# Patient Record
Sex: FEMALE | Race: ASIAN | NOT HISPANIC OR LATINO
[De-identification: names, ages, dates, MRNs, and addresses within clinical notes are randomized per-mention and may not be internally consistent; named-entity substitution may affect disease eponyms.]

---

## 2020-03-10 PROBLEM — Z00.00 ENCOUNTER FOR PREVENTIVE HEALTH EXAMINATION: Status: ACTIVE | Noted: 2020-03-10

## 2020-03-12 ENCOUNTER — APPOINTMENT (OUTPATIENT)
Dept: ORTHOPEDIC SURGERY | Facility: CLINIC | Age: 70
End: 2020-03-12
Payer: MEDICARE

## 2020-03-12 VITALS
BODY MASS INDEX: 34.1 KG/M2 | DIASTOLIC BLOOD PRESSURE: 83 MMHG | HEART RATE: 97 BPM | SYSTOLIC BLOOD PRESSURE: 123 MMHG | WEIGHT: 225 LBS | HEIGHT: 68 IN

## 2020-03-12 DIAGNOSIS — Z78.9 OTHER SPECIFIED HEALTH STATUS: ICD-10-CM

## 2020-03-12 DIAGNOSIS — M17.0 BILATERAL PRIMARY OSTEOARTHRITIS OF KNEE: ICD-10-CM

## 2020-03-12 DIAGNOSIS — Z56.0 UNEMPLOYMENT, UNSPECIFIED: ICD-10-CM

## 2020-03-12 DIAGNOSIS — Z82.49 FAMILY HISTORY OF ISCHEMIC HEART DISEASE AND OTHER DISEASES OF THE CIRCULATORY SYSTEM: ICD-10-CM

## 2020-03-12 DIAGNOSIS — Z86.39 PERSONAL HISTORY OF OTHER ENDOCRINE, NUTRITIONAL AND METABOLIC DISEASE: ICD-10-CM

## 2020-03-12 DIAGNOSIS — Z86.79 PERSONAL HISTORY OF OTHER DISEASES OF THE CIRCULATORY SYSTEM: ICD-10-CM

## 2020-03-12 DIAGNOSIS — Z72.3 LACK OF PHYSICAL EXERCISE: ICD-10-CM

## 2020-03-12 DIAGNOSIS — Z80.41 FAMILY HISTORY OF MALIGNANT NEOPLASM OF OVARY: ICD-10-CM

## 2020-03-12 DIAGNOSIS — Z83.3 FAMILY HISTORY OF DIABETES MELLITUS: ICD-10-CM

## 2020-03-12 DIAGNOSIS — Z87.39 PERSONAL HISTORY OF OTHER DISEASES OF THE MUSCULOSKELETAL SYSTEM AND CONNECTIVE TISSUE: ICD-10-CM

## 2020-03-12 PROCEDURE — 72170 X-RAY EXAM OF PELVIS: CPT

## 2020-03-12 PROCEDURE — 99204 OFFICE O/P NEW MOD 45 MIN: CPT

## 2020-03-12 PROCEDURE — 73562 X-RAY EXAM OF KNEE 3: CPT | Mod: LT

## 2020-03-12 SDOH — ECONOMIC STABILITY - INCOME SECURITY: UNEMPLOYMENT, UNSPECIFIED: Z56.0

## 2020-03-16 PROBLEM — Z78.9 CURRENT NON-SMOKER: Status: ACTIVE | Noted: 2020-03-12

## 2020-03-16 PROBLEM — Z56.0 UNEMPLOYED: Status: ACTIVE | Noted: 2020-03-12

## 2020-03-16 PROBLEM — Z78.9 RARELY CONSUMES ALCOHOL: Status: ACTIVE | Noted: 2020-03-12

## 2020-03-16 PROBLEM — Z83.3 FAMILY HISTORY OF DIABETES MELLITUS: Status: ACTIVE | Noted: 2020-03-12

## 2020-03-16 PROBLEM — Z80.41 FAMILY HISTORY OF MALIGNANT NEOPLASM OF OVARY: Status: ACTIVE | Noted: 2020-03-12

## 2020-03-16 PROBLEM — Z86.39 HISTORY OF HYPERCHOLESTEROLEMIA: Status: RESOLVED | Noted: 2020-03-12 | Resolved: 2020-03-16

## 2020-03-16 PROBLEM — Z86.79 HISTORY OF HYPERTENSION: Status: RESOLVED | Noted: 2020-03-12 | Resolved: 2020-03-16

## 2020-03-16 PROBLEM — Z87.39 HISTORY OF ARTHRITIS: Status: RESOLVED | Noted: 2020-03-12 | Resolved: 2020-03-16

## 2020-03-16 PROBLEM — Z78.9 DOES NOT USE ILLICIT DRUGS: Status: ACTIVE | Noted: 2020-03-12

## 2020-03-16 PROBLEM — Z82.49 FAMILY HISTORY OF HYPERTENSION: Status: ACTIVE | Noted: 2020-03-12

## 2020-03-16 PROBLEM — Z86.39 HISTORY OF DIABETES MELLITUS: Status: RESOLVED | Noted: 2020-03-12 | Resolved: 2020-03-16

## 2020-03-16 PROBLEM — Z72.3 DOES NOT EXERCISE: Status: ACTIVE | Noted: 2020-03-12

## 2020-03-16 RX ORDER — METFORMIN HYDROCHLORIDE 625 MG/1
TABLET ORAL
Refills: 0 | Status: ACTIVE | COMMUNITY

## 2020-03-16 RX ORDER — CILOSTAZOL 100 MG/1
TABLET ORAL
Refills: 0 | Status: ACTIVE | COMMUNITY

## 2020-03-16 RX ORDER — LOSARTAN POTASSIUM 100 MG/1
TABLET, FILM COATED ORAL
Refills: 0 | Status: ACTIVE | COMMUNITY

## 2020-03-16 RX ORDER — SIMVASTATIN 80 MG/1
TABLET, FILM COATED ORAL
Refills: 0 | Status: ACTIVE | COMMUNITY

## 2020-03-16 NOTE — PHYSICAL EXAM
[Antalgic] : antalgic [LE] : Sensory: Intact in bilateral lower extremities [Knee] : patellar 2+ and symmetric bilaterally [Ankle] : ankle 2+ and symmetric bilaterally [DP] : dorsalis pedis 2+ and symmetric bilaterally [PT] : posterior tibial 2+ and symmetric bilaterally [de-identified] : On general examination the patient is adequately groomed and nourished. The vital parameters are as recorded. \par There is no lymphedema or diffuse swelling, no varicose veins, no skin warmth/erythema/scars/swelling, no ulcers and no palpable lymph nodes or masses in both lower extremities. Bilateral pedal pulses are well palpable.\par Upper Extremity:\par Both right and left upper extremities are unremarkable in terms of skin rash, lesions, pigmentation, redness, tenderness, swelling, joint instability, abnormal deformity or crepitus. The overall range of motion, sensation, motor tone and strength testing are normal.\par \par Bilateral Knee Exam: \par The gait is bilateral stiff knee antalgic.\par Knee alignment:            Right 5 degrees varus with no flexion deformity. \par Left 5 degrees varus with no flexion deformity.\par Both knees demonstrate no scars and the skin has no warmth, erythema, swelling or tenderness. \par Both knees have a range of motion of\par Extension:                    Right 0 degrees     Left 0 degrees\par Flexion:                                   Right 100 degrees      Left 100 degrees\par There is bilateral knee medial joint line tenderness. There is bilateral knee mild effusion. \par King's test is positive. Jeanmarie test is positive.\par Lachman's test, Anterior/Posterior Drawer test and Pivot Shift Tests are negative. \par There is bilateral knee grade 1 MCL mediolateral laxity and no anteroposterior instability. \par Patella compression test is positive and patellofemoral tracking is normal with no lateral subluxation, apprehension or instability. \par Right knee quadriceps and hamstrings power is 4+.\par Left knee quadriceps and hamstrings power is 4+.\par \par Hip Exam:\par The gait and station is normal\par The patient has equal leg lengths and no pelvic tilt. Bony/Federico test is 7 inches on the right and 7 inches on the left. Active SLR is 60 degrees on the right and 60 degrees on the left. Both hips demonstrate no scars and the skin has no signs of inflammation or tenderness. \par Both Hips have a normal range of motion of flexion to 100 degrees, abduction 40 degrees, adduction 20 degrees, external rotation 40 degrees, internal rotation 20 degrees with symmetrical motion in flexion and extension. There is no flexion contracture, deformity or instability. Labral impingement tests are negative.\par Both hips flexor, abductor and extensor power is normal.  [de-identified] : The following radiographs were ordered and read by me during this patients visit. I reviewed each radiograph in detail with the patient and discussed the findings as highlighted below. \par AP, lateral and skyline views of the bilateral knees confirm advanced degenerative joint disease with medial joint space narrowing and osteophyte formation\par AP view of the pelvis are within normal limits \par \par MRI of right knee taken in 2017 reveals medial meniscus tear, with extrusion of torn medial meniscus, lateral meniscus tear, diffuse full thickness articular loss, moderate joint effusion, Trace popliteal cyst.

## 2020-03-16 NOTE — HISTORY OF PRESENT ILLNESS
[8] : an average pain level of 8/10 [de-identified] : Ms. CORAZON MORALES is a 69 year old female presenting with bilateral knee pain, now worsening. Right worse than left.  She localizes the pain to the medial and anterior aspect of the bilateral knee. The patient describes the pain as sharp stabbing pain, and states it is intermittent, based on activity. She states the pain is exacerbated by walking long distance, climbing/descending stairs, and rising from a seated position. She has been taking Ibuprofen long term for pain with only mild temporary relief. She admits to prior conservative management inclusive of physical therapy with only mild improvement in condition. Has had a history of gel and cortisone injections, which only works temporarily, last injection was 3 weeks ago, a steroid injection to the bilateral knees. . She denies hip or lower back pain. The patient admits to limitations in their quality of life, and is present to discuss options for treatment. JTM \par DM on metformin with A1C of 7. Temporal Arteritis

## 2020-03-16 NOTE — DISCUSSION/SUMMARY
[de-identified] : Bilateral Knee advanced degenerative joint disease, Right worse than left. \par The natural history and treatment of degenerative arthritis was discussed with the patient at length today. The spectrum of treatment including nonoperative modalities to surgical intervention was elucidated. Noninvasive and nonoperative treatment modalities include weight reduction, activity modification with low impact exercise,  as needed use of acetaminophen or anti-inflammatory medications if tolerated, glucosamine/chondroitin supplements, and physical therapy. Further treatments can include corticosteroid injection and the use of viscosupplementation with hyaluronic acid injections. Definitive surgical treatment can certainly include total joint arthroplasty also. The risks and benefits of each treatment options was discussed and all questions were answered.\par In view of lack of adequate pain relief with conservative (non-surgical) management protocol including physical therapy, home exercises, weight loss, activity modification, NSAIDS; the patient is recommended to consider a Right Total Knee Replacement. \par \par The risks, benefits, alternatives, implications, complications including but not limited to pain, stiffness, bleeding, limp, wound breakdown, infection, bone fracture, nerve and vascular compromise, implant wear, fixation options depending on bone quality, instability, and durability issues and rehabilitation were discussed and relevant questions were addressed. The possibility of recurrent pain, no improvement in pain and actual worsening of the pain were also mentioned in conversation with the patient. Medical complications related to the patient's general medical health including deep vein thrombosis, pulmonary embolus, heart attack, stroke, death and other complications from anesthesia were discussed as well.  Anticoagulation prophylaxis medication options to address risks of deep vein thrombosis and pulmonary embolism were discussed and weighed against the risks of bleeding and wound healing complications. The patient elected Ecotrin/Xarelto prophylaxis with mechanical modalities.\par \par  I have reviewed the plan of care as well as a model of a total knee replacement implant equivalent to the one that will be used for their total knee replacement.  The patient agrees with the plan of care, as well as the use of implants for their total knee replacement.  The patient wishes to proceed and will undergo preoperative medical evaluation and clearance, attend the preoperative educational class and will schedule surgery appropriately. \par \par Due to recent injection to the bilateral knees, patient must wait until at least June 2020 for surgery, to decrease risk of infection and optimize patient fro elective surgery.

## 2021-03-18 ENCOUNTER — APPOINTMENT (OUTPATIENT)
Dept: ORTHOPEDIC SURGERY | Facility: CLINIC | Age: 71
End: 2021-03-18
Payer: MEDICARE

## 2021-03-18 VITALS — BODY MASS INDEX: 33.34 KG/M2 | HEIGHT: 68 IN | WEIGHT: 220 LBS

## 2021-03-18 DIAGNOSIS — M25.561 PAIN IN RIGHT KNEE: ICD-10-CM

## 2021-03-18 DIAGNOSIS — G89.29 PAIN IN RIGHT KNEE: ICD-10-CM

## 2021-03-18 DIAGNOSIS — M25.562 PAIN IN RIGHT KNEE: ICD-10-CM

## 2021-03-18 PROCEDURE — 73562 X-RAY EXAM OF KNEE 3: CPT | Mod: LT

## 2021-03-18 PROCEDURE — 99072 ADDL SUPL MATRL&STAF TM PHE: CPT

## 2021-03-18 PROCEDURE — 99213 OFFICE O/P EST LOW 20 MIN: CPT

## 2021-03-30 ENCOUNTER — OUTPATIENT (OUTPATIENT)
Dept: OUTPATIENT SERVICES | Facility: HOSPITAL | Age: 71
LOS: 1 days | End: 2021-03-30
Payer: MEDICARE

## 2021-03-30 VITALS
SYSTOLIC BLOOD PRESSURE: 110 MMHG | HEART RATE: 89 BPM | OXYGEN SATURATION: 98 % | TEMPERATURE: 97 F | RESPIRATION RATE: 16 BRPM | HEIGHT: 66 IN | DIASTOLIC BLOOD PRESSURE: 64 MMHG | WEIGHT: 218.04 LBS

## 2021-03-30 DIAGNOSIS — I10 ESSENTIAL (PRIMARY) HYPERTENSION: ICD-10-CM

## 2021-03-30 DIAGNOSIS — R94.31 ABNORMAL ELECTROCARDIOGRAM [ECG] [EKG]: ICD-10-CM

## 2021-03-30 DIAGNOSIS — E11.9 TYPE 2 DIABETES MELLITUS WITHOUT COMPLICATIONS: ICD-10-CM

## 2021-03-30 DIAGNOSIS — M17.11 UNILATERAL PRIMARY OSTEOARTHRITIS, RIGHT KNEE: ICD-10-CM

## 2021-03-30 DIAGNOSIS — Z98.890 OTHER SPECIFIED POSTPROCEDURAL STATES: Chronic | ICD-10-CM

## 2021-03-30 LAB
A1C WITH ESTIMATED AVERAGE GLUCOSE RESULT: 7.2 % — HIGH (ref 4–5.6)
ANION GAP SERPL CALC-SCNC: 12 MMOL/L — SIGNIFICANT CHANGE UP (ref 7–14)
APPEARANCE UR: CLEAR — SIGNIFICANT CHANGE UP
BILIRUB UR-MCNC: NEGATIVE — SIGNIFICANT CHANGE UP
BLD GP AB SCN SERPL QL: NEGATIVE — SIGNIFICANT CHANGE UP
BUN SERPL-MCNC: 17 MG/DL — SIGNIFICANT CHANGE UP (ref 7–23)
CALCIUM SERPL-MCNC: 9.9 MG/DL — SIGNIFICANT CHANGE UP (ref 8.4–10.5)
CHLORIDE SERPL-SCNC: 102 MMOL/L — SIGNIFICANT CHANGE UP (ref 98–107)
CO2 SERPL-SCNC: 22 MMOL/L — SIGNIFICANT CHANGE UP (ref 22–31)
COLOR SPEC: YELLOW — SIGNIFICANT CHANGE UP
CREAT SERPL-MCNC: 0.92 MG/DL — SIGNIFICANT CHANGE UP (ref 0.5–1.3)
DIFF PNL FLD: NEGATIVE — SIGNIFICANT CHANGE UP
ESTIMATED AVERAGE GLUCOSE: 160 MG/DL — HIGH (ref 68–114)
GLUCOSE SERPL-MCNC: 111 MG/DL — HIGH (ref 70–99)
GLUCOSE UR QL: NEGATIVE — SIGNIFICANT CHANGE UP
HCT VFR BLD CALC: 34.2 % — LOW (ref 34.5–45)
HGB BLD-MCNC: 10.9 G/DL — LOW (ref 11.5–15.5)
KETONES UR-MCNC: NEGATIVE — SIGNIFICANT CHANGE UP
LEUKOCYTE ESTERASE UR-ACNC: NEGATIVE — SIGNIFICANT CHANGE UP
MCHC RBC-ENTMCNC: 28.2 PG — SIGNIFICANT CHANGE UP (ref 27–34)
MCHC RBC-ENTMCNC: 31.9 GM/DL — LOW (ref 32–36)
MCV RBC AUTO: 88.4 FL — SIGNIFICANT CHANGE UP (ref 80–100)
MRSA PCR RESULT.: DETECTED
NITRITE UR-MCNC: NEGATIVE — SIGNIFICANT CHANGE UP
NRBC # BLD: 0 /100 WBCS — SIGNIFICANT CHANGE UP
NRBC # FLD: 0 K/UL — SIGNIFICANT CHANGE UP
PH UR: 6 — SIGNIFICANT CHANGE UP (ref 5–8)
PLATELET # BLD AUTO: 296 K/UL — SIGNIFICANT CHANGE UP (ref 150–400)
POTASSIUM SERPL-MCNC: 4 MMOL/L — SIGNIFICANT CHANGE UP (ref 3.5–5.3)
POTASSIUM SERPL-SCNC: 4 MMOL/L — SIGNIFICANT CHANGE UP (ref 3.5–5.3)
PROT UR-MCNC: NEGATIVE — SIGNIFICANT CHANGE UP
RBC # BLD: 3.87 M/UL — SIGNIFICANT CHANGE UP (ref 3.8–5.2)
RBC # FLD: 13.7 % — SIGNIFICANT CHANGE UP (ref 10.3–14.5)
RH IG SCN BLD-IMP: POSITIVE — SIGNIFICANT CHANGE UP
S AUREUS DNA NOSE QL NAA+PROBE: DETECTED
SODIUM SERPL-SCNC: 136 MMOL/L — SIGNIFICANT CHANGE UP (ref 135–145)
SP GR SPEC: 1.02 — SIGNIFICANT CHANGE UP (ref 1.01–1.02)
UROBILINOGEN FLD QL: SIGNIFICANT CHANGE UP
WBC # BLD: 8.56 K/UL — SIGNIFICANT CHANGE UP (ref 3.8–10.5)
WBC # FLD AUTO: 8.56 K/UL — SIGNIFICANT CHANGE UP (ref 3.8–10.5)

## 2021-03-30 PROCEDURE — 93010 ELECTROCARDIOGRAM REPORT: CPT

## 2021-03-30 RX ORDER — SODIUM CHLORIDE 9 MG/ML
3 INJECTION INTRAMUSCULAR; INTRAVENOUS; SUBCUTANEOUS EVERY 8 HOURS
Refills: 0 | Status: DISCONTINUED | OUTPATIENT
Start: 2021-04-19 | End: 2021-04-20

## 2021-03-30 NOTE — H&P PST ADULT - HISTORY OF PRESENT ILLNESS
70 y/o female  with HTN, HLD and Type 2 DM presents to PST preop for right total knee replacement with Miguel robot on 4/19/21. pt reports right knee pain x2 years that is getting progressively worse. pt states despite conservative measures (PT and injections), right knee pain persists.  70 y/o female with HTN, HLD and Type 2 DM presents to PST preop for right total knee replacement with Miguel robot on 4/19/21. pt reports right knee pain x2 years that is getting progressively worse. pt states despite conservative measures (PT and injections), right knee pain persists.

## 2021-03-30 NOTE — H&P PST ADULT - ENDOCRINE COMMENTS
type 2 DM on metformin. fasting -210mg/dl. h/o graves disease 10 yrs ago. previously treated with meds. no meds at present

## 2021-03-30 NOTE — H&P PST ADULT - NEGATIVE NEUROLOGICAL SYMPTOMS
no transient paralysis/no weakness/no generalized seizures/no focal seizures/no syncope/no tremors/no vertigo/no loss of sensation/no headache/no loss of consciousness/no hemiparesis/no confusion/no facial palsy no transient paralysis/no weakness/no paresthesias/no generalized seizures/no focal seizures/no syncope/no tremors/no vertigo/no loss of sensation/no headache/no loss of consciousness/no hemiparesis/no confusion/no facial palsy

## 2021-03-30 NOTE — H&P PST ADULT - NEGATIVE BREAST SYMPTOMS
no breast tenderness L/no breast tenderness R/no breast lump L/no nipple discharge L/no nipple discharge R no breast tenderness L/no breast tenderness R/no breast lump L/no breast lump R/no nipple discharge L/no nipple discharge R

## 2021-03-30 NOTE — H&P PST ADULT - NSICDXFAMILYHX_GEN_ALL_CORE_FT
FAMILY HISTORY:  Mother  Still living? Unknown  FH: HTN (hypertension), Age at diagnosis: Age Unknown    Sibling  Still living? Unknown  FH: HTN (hypertension), Age at diagnosis: Age Unknown

## 2021-03-30 NOTE — H&P PST ADULT - NSICDXPROBLEM_GEN_ALL_CORE_FT
PROBLEM DIAGNOSES  Problem: Unilateral primary osteoarthritis, right knee  Assessment and Plan: preop for right total knee replacement with quintin robot on 4/19/21  preop instructions given, pt verbalized understanding  GI prophylaxis and chlorhexidine wash provided  pt is scheduled for COVID testing preop      Problem: HTN (hypertension)  Assessment and Plan: pt will take blood pressure meds losartan AM of surgery as prescribed      Problem: Type 2 diabetes mellitus  Assessment and Plan: metformin to be held 24 hours preop  accu check to be assessed on admission         PROBLEM DIAGNOSES  Problem: Unilateral primary osteoarthritis, right knee  Assessment and Plan: preop for right total knee replacement with quintin robot on 4/19/21  preop instructions given, pt verbalized understanding  GI prophylaxis and chlorhexidine wash provided  pt is scheduled for COVID testing preop      Problem: HTN (hypertension)  Assessment and Plan: pt will take blood pressure meds losartan AM of surgery as prescribed      Problem: Type 2 diabetes mellitus  Assessment and Plan: metformin to be held 24 hours preop  accu check to be assessed on admission      Problem: Abnormal EKG  Assessment and Plan: medical clearance, comparison EKG and ECHO requested

## 2021-03-30 NOTE — H&P PST ADULT - NSANTHOSAYNRD_GEN_A_CORE
No. DAMARI screening performed.  STOP BANG Legend: 0-2 = LOW Risk; 3-4 = INTERMEDIATE Risk; 5-8 = HIGH Risk

## 2021-03-30 NOTE — H&P PST ADULT - NSICDXPASTMEDICALHX_GEN_ALL_CORE_FT
PAST MEDICAL HISTORY:  H/O Graves' disease 10 years ago    HLD (hyperlipidemia)     HTN (hypertension)     Type 2 diabetes mellitus     Unilateral primary osteoarthritis, right knee

## 2021-03-30 NOTE — H&P PST ADULT - NEGATIVE CARDIOVASCULAR SYMPTOMS
no palpitations/no dyspnea on exertion/no peripheral edema no chest pain/no palpitations/no dyspnea on exertion/no peripheral edema

## 2021-03-31 ENCOUNTER — OUTPATIENT (OUTPATIENT)
Dept: OUTPATIENT SERVICES | Facility: HOSPITAL | Age: 71
LOS: 1 days | End: 2021-03-31
Payer: COMMERCIAL

## 2021-03-31 ENCOUNTER — APPOINTMENT (OUTPATIENT)
Dept: CT IMAGING | Facility: CLINIC | Age: 71
End: 2021-03-31

## 2021-03-31 DIAGNOSIS — M17.11 UNILATERAL PRIMARY OSTEOARTHRITIS, RIGHT KNEE: ICD-10-CM

## 2021-03-31 DIAGNOSIS — Z98.890 OTHER SPECIFIED POSTPROCEDURAL STATES: Chronic | ICD-10-CM

## 2021-03-31 LAB
CULTURE RESULTS: NO GROWTH — SIGNIFICANT CHANGE UP
SPECIMEN SOURCE: SIGNIFICANT CHANGE UP

## 2021-03-31 PROCEDURE — 73700 CT LOWER EXTREMITY W/O DYE: CPT | Mod: 26,RT

## 2021-03-31 PROCEDURE — 73700 CT LOWER EXTREMITY W/O DYE: CPT

## 2021-04-02 RX ORDER — MUPIROCIN 20 MG/G
2 OINTMENT TOPICAL
Qty: 1 | Refills: 0 | Status: ACTIVE | COMMUNITY
Start: 2021-04-02 | End: 1900-01-01

## 2021-04-11 DIAGNOSIS — Z01.818 ENCOUNTER FOR OTHER PREPROCEDURAL EXAMINATION: ICD-10-CM

## 2021-04-16 ENCOUNTER — APPOINTMENT (OUTPATIENT)
Dept: DISASTER EMERGENCY | Facility: CLINIC | Age: 71
End: 2021-04-16

## 2021-04-16 NOTE — ASU PATIENT PROFILE, ADULT - PMH
H/O Graves' disease  10 years ago  HLD (hyperlipidemia)    HTN (hypertension)    Type 2 diabetes mellitus    Unilateral primary osteoarthritis, right knee

## 2021-04-16 NOTE — ASU PATIENT PROFILE, ADULT - PROVIDER NOTIFICATION
Contacted [facility] to obtain 2017 mammogram report.  Left message, currently awaiting call back.     
Yes

## 2021-04-16 NOTE — ASU PATIENT PROFILE, ADULT - ABILITY TO HEAR (WITH HEARING AID OR HEARING APPLIANCE IF NORMALLY USED):
----- Message from More Jerry sent at 4/25/2017 11:01 AM CDT -----  Contact: pt  Pt states she would like to speak to the nurse regarding a refill for Ibuprofen and diabetes needles.      Pt uses [facility] [address] [address]  Phone: [phone] Fax: [phone]    Please call the pt @ [phone].   Adequate: hears normal conversation without difficulty

## 2021-04-17 LAB — SARS-COV-2 N GENE NPH QL NAA+PROBE: NOT DETECTED

## 2021-04-18 ENCOUNTER — TRANSCRIPTION ENCOUNTER (OUTPATIENT)
Age: 71
End: 2021-04-18

## 2021-04-19 ENCOUNTER — TRANSCRIPTION ENCOUNTER (OUTPATIENT)
Age: 71
End: 2021-04-19

## 2021-04-19 ENCOUNTER — APPOINTMENT (OUTPATIENT)
Dept: ORTHOPEDIC SURGERY | Facility: HOSPITAL | Age: 71
End: 2021-04-19

## 2021-04-19 ENCOUNTER — INPATIENT (INPATIENT)
Facility: HOSPITAL | Age: 71
LOS: 0 days | Discharge: HOME HEALTH | End: 2021-04-20
Attending: ORTHOPAEDIC SURGERY | Admitting: ORTHOPAEDIC SURGERY
Payer: MEDICARE

## 2021-04-19 VITALS
OXYGEN SATURATION: 96 % | HEIGHT: 66 IN | WEIGHT: 218.04 LBS | SYSTOLIC BLOOD PRESSURE: 156 MMHG | HEART RATE: 85 BPM | RESPIRATION RATE: 16 BRPM | DIASTOLIC BLOOD PRESSURE: 88 MMHG | TEMPERATURE: 98 F

## 2021-04-19 DIAGNOSIS — M17.11 UNILATERAL PRIMARY OSTEOARTHRITIS, RIGHT KNEE: ICD-10-CM

## 2021-04-19 DIAGNOSIS — I10 ESSENTIAL (PRIMARY) HYPERTENSION: ICD-10-CM

## 2021-04-19 DIAGNOSIS — E78.5 HYPERLIPIDEMIA, UNSPECIFIED: ICD-10-CM

## 2021-04-19 DIAGNOSIS — E11.9 TYPE 2 DIABETES MELLITUS WITHOUT COMPLICATIONS: ICD-10-CM

## 2021-04-19 DIAGNOSIS — D64.9 ANEMIA, UNSPECIFIED: ICD-10-CM

## 2021-04-19 DIAGNOSIS — Z98.890 OTHER SPECIFIED POSTPROCEDURAL STATES: Chronic | ICD-10-CM

## 2021-04-19 DIAGNOSIS — Z29.9 ENCOUNTER FOR PROPHYLACTIC MEASURES, UNSPECIFIED: ICD-10-CM

## 2021-04-19 LAB
ANION GAP SERPL CALC-SCNC: 10 MMOL/L — SIGNIFICANT CHANGE UP (ref 7–14)
BUN SERPL-MCNC: 13 MG/DL — SIGNIFICANT CHANGE UP (ref 7–23)
CALCIUM SERPL-MCNC: 9.3 MG/DL — SIGNIFICANT CHANGE UP (ref 8.4–10.5)
CHLORIDE SERPL-SCNC: 108 MMOL/L — HIGH (ref 98–107)
CO2 SERPL-SCNC: 23 MMOL/L — SIGNIFICANT CHANGE UP (ref 22–31)
CREAT SERPL-MCNC: 1.09 MG/DL — SIGNIFICANT CHANGE UP (ref 0.5–1.3)
GLUCOSE BLDC GLUCOMTR-MCNC: 119 MG/DL — HIGH (ref 70–99)
GLUCOSE BLDC GLUCOMTR-MCNC: 126 MG/DL — HIGH (ref 70–99)
GLUCOSE BLDC GLUCOMTR-MCNC: 222 MG/DL — HIGH (ref 70–99)
GLUCOSE BLDC GLUCOMTR-MCNC: 223 MG/DL — HIGH (ref 70–99)
GLUCOSE SERPL-MCNC: 134 MG/DL — HIGH (ref 70–99)
HCT VFR BLD CALC: 33.7 % — LOW (ref 34.5–45)
HGB BLD-MCNC: 10.7 G/DL — LOW (ref 11.5–15.5)
MCHC RBC-ENTMCNC: 28.5 PG — SIGNIFICANT CHANGE UP (ref 27–34)
MCHC RBC-ENTMCNC: 31.8 GM/DL — LOW (ref 32–36)
MCV RBC AUTO: 89.9 FL — SIGNIFICANT CHANGE UP (ref 80–100)
NRBC # BLD: 0 /100 WBCS — SIGNIFICANT CHANGE UP
NRBC # FLD: 0 K/UL — SIGNIFICANT CHANGE UP
PLATELET # BLD AUTO: 271 K/UL — SIGNIFICANT CHANGE UP (ref 150–400)
POTASSIUM SERPL-MCNC: 4.5 MMOL/L — SIGNIFICANT CHANGE UP (ref 3.5–5.3)
POTASSIUM SERPL-SCNC: 4.5 MMOL/L — SIGNIFICANT CHANGE UP (ref 3.5–5.3)
RBC # BLD: 3.75 M/UL — LOW (ref 3.8–5.2)
RBC # FLD: 14 % — SIGNIFICANT CHANGE UP (ref 10.3–14.5)
RH IG SCN BLD-IMP: POSITIVE — SIGNIFICANT CHANGE UP
SODIUM SERPL-SCNC: 141 MMOL/L — SIGNIFICANT CHANGE UP (ref 135–145)
WBC # BLD: 9.16 K/UL — SIGNIFICANT CHANGE UP (ref 3.8–10.5)
WBC # FLD AUTO: 9.16 K/UL — SIGNIFICANT CHANGE UP (ref 3.8–10.5)

## 2021-04-19 PROCEDURE — S2900 ROBOTIC SURGICAL SYSTEM: CPT | Mod: NC

## 2021-04-19 PROCEDURE — 20985 CPTR-ASST DIR MS PX: CPT

## 2021-04-19 PROCEDURE — 27447 TOTAL KNEE ARTHROPLASTY: CPT | Mod: RT

## 2021-04-19 PROCEDURE — 0055T BONE SRGRY CMPTR CT/MRI IMAG: CPT

## 2021-04-19 PROCEDURE — 27447 TOTAL KNEE ARTHROPLASTY: CPT | Mod: 80,RT

## 2021-04-19 PROCEDURE — 99223 1ST HOSP IP/OBS HIGH 75: CPT

## 2021-04-19 PROCEDURE — 73560 X-RAY EXAM OF KNEE 1 OR 2: CPT | Mod: 26,RT

## 2021-04-19 RX ORDER — GABAPENTIN 400 MG/1
100 CAPSULE ORAL THREE TIMES A DAY
Refills: 0 | Status: DISCONTINUED | OUTPATIENT
Start: 2021-04-19 | End: 2021-04-20

## 2021-04-19 RX ORDER — POLYETHYLENE GLYCOL 3350 17 G/17G
17 POWDER, FOR SOLUTION ORAL AT BEDTIME
Refills: 0 | Status: DISCONTINUED | OUTPATIENT
Start: 2021-04-19 | End: 2021-04-20

## 2021-04-19 RX ORDER — OXYCODONE HYDROCHLORIDE 5 MG/1
10 TABLET ORAL
Refills: 0 | Status: DISCONTINUED | OUTPATIENT
Start: 2021-04-19 | End: 2021-04-20

## 2021-04-19 RX ORDER — TRAMADOL HYDROCHLORIDE 50 MG/1
50 TABLET ORAL EVERY 8 HOURS
Refills: 0 | Status: DISCONTINUED | OUTPATIENT
Start: 2021-04-19 | End: 2021-04-20

## 2021-04-19 RX ORDER — METOCLOPRAMIDE HCL 10 MG
10 TABLET ORAL ONCE
Refills: 0 | Status: DISCONTINUED | OUTPATIENT
Start: 2021-04-19 | End: 2021-04-19

## 2021-04-19 RX ORDER — KETOROLAC TROMETHAMINE 30 MG/ML
15 SYRINGE (ML) INJECTION EVERY 6 HOURS
Refills: 0 | Status: DISCONTINUED | OUTPATIENT
Start: 2021-04-19 | End: 2021-04-20

## 2021-04-19 RX ORDER — SENNA PLUS 8.6 MG/1
2 TABLET ORAL AT BEDTIME
Refills: 0 | Status: DISCONTINUED | OUTPATIENT
Start: 2021-04-19 | End: 2021-04-20

## 2021-04-19 RX ORDER — OXYCODONE HYDROCHLORIDE 5 MG/1
2.5 TABLET ORAL
Refills: 0 | Status: DISCONTINUED | OUTPATIENT
Start: 2021-04-19 | End: 2021-04-20

## 2021-04-19 RX ORDER — INSULIN LISPRO 100/ML
VIAL (ML) SUBCUTANEOUS
Refills: 0 | Status: DISCONTINUED | OUTPATIENT
Start: 2021-04-19 | End: 2021-04-20

## 2021-04-19 RX ORDER — DEXTROSE 50 % IN WATER 50 %
25 SYRINGE (ML) INTRAVENOUS ONCE
Refills: 0 | Status: DISCONTINUED | OUTPATIENT
Start: 2021-04-19 | End: 2021-04-20

## 2021-04-19 RX ORDER — SODIUM CHLORIDE 9 MG/ML
1000 INJECTION, SOLUTION INTRAVENOUS
Refills: 0 | Status: DISCONTINUED | OUTPATIENT
Start: 2021-04-19 | End: 2021-04-20

## 2021-04-19 RX ORDER — ACETAMINOPHEN 500 MG
1000 TABLET ORAL ONCE
Refills: 0 | Status: COMPLETED | OUTPATIENT
Start: 2021-04-19 | End: 2021-04-19

## 2021-04-19 RX ORDER — SODIUM CHLORIDE 9 MG/ML
1000 INJECTION, SOLUTION INTRAVENOUS
Refills: 0 | Status: DISCONTINUED | OUTPATIENT
Start: 2021-04-19 | End: 2021-04-19

## 2021-04-19 RX ORDER — TRAMADOL HYDROCHLORIDE 50 MG/1
50 TABLET ORAL ONCE
Refills: 0 | Status: DISCONTINUED | OUTPATIENT
Start: 2021-04-19 | End: 2021-04-19

## 2021-04-19 RX ORDER — HYDROMORPHONE HYDROCHLORIDE 2 MG/ML
0.5 INJECTION INTRAMUSCULAR; INTRAVENOUS; SUBCUTANEOUS ONCE
Refills: 0 | Status: DISCONTINUED | OUTPATIENT
Start: 2021-04-19 | End: 2021-04-20

## 2021-04-19 RX ORDER — ASPIRIN/CALCIUM CARB/MAGNESIUM 324 MG
325 TABLET ORAL
Refills: 0 | Status: DISCONTINUED | OUTPATIENT
Start: 2021-04-19 | End: 2021-04-20

## 2021-04-19 RX ORDER — LOSARTAN POTASSIUM 100 MG/1
100 TABLET, FILM COATED ORAL DAILY
Refills: 0 | Status: DISCONTINUED | OUTPATIENT
Start: 2021-04-19 | End: 2021-04-20

## 2021-04-19 RX ORDER — CEFAZOLIN SODIUM 1 G
2000 VIAL (EA) INJECTION EVERY 8 HOURS
Refills: 0 | Status: COMPLETED | OUTPATIENT
Start: 2021-04-19 | End: 2021-04-19

## 2021-04-19 RX ORDER — OXYCODONE HYDROCHLORIDE 5 MG/1
5 TABLET ORAL
Refills: 0 | Status: DISCONTINUED | OUTPATIENT
Start: 2021-04-19 | End: 2021-04-20

## 2021-04-19 RX ORDER — FENTANYL CITRATE 50 UG/ML
25 INJECTION INTRAVENOUS
Refills: 0 | Status: DISCONTINUED | OUTPATIENT
Start: 2021-04-19 | End: 2021-04-19

## 2021-04-19 RX ORDER — DEXTROSE 50 % IN WATER 50 %
12.5 SYRINGE (ML) INTRAVENOUS ONCE
Refills: 0 | Status: DISCONTINUED | OUTPATIENT
Start: 2021-04-19 | End: 2021-04-20

## 2021-04-19 RX ORDER — GLUCAGON INJECTION, SOLUTION 0.5 MG/.1ML
1 INJECTION, SOLUTION SUBCUTANEOUS ONCE
Refills: 0 | Status: DISCONTINUED | OUTPATIENT
Start: 2021-04-19 | End: 2021-04-20

## 2021-04-19 RX ORDER — INSULIN LISPRO 100/ML
VIAL (ML) SUBCUTANEOUS AT BEDTIME
Refills: 0 | Status: DISCONTINUED | OUTPATIENT
Start: 2021-04-19 | End: 2021-04-20

## 2021-04-19 RX ORDER — MAGNESIUM HYDROXIDE 400 MG/1
30 TABLET, CHEWABLE ORAL DAILY
Refills: 0 | Status: DISCONTINUED | OUTPATIENT
Start: 2021-04-19 | End: 2021-04-20

## 2021-04-19 RX ORDER — ACETAMINOPHEN 500 MG
975 TABLET ORAL ONCE
Refills: 0 | Status: COMPLETED | OUTPATIENT
Start: 2021-04-19 | End: 2021-04-19

## 2021-04-19 RX ORDER — PANTOPRAZOLE SODIUM 20 MG/1
40 TABLET, DELAYED RELEASE ORAL ONCE
Refills: 0 | Status: COMPLETED | OUTPATIENT
Start: 2021-04-19 | End: 2021-04-19

## 2021-04-19 RX ORDER — PANTOPRAZOLE SODIUM 20 MG/1
40 TABLET, DELAYED RELEASE ORAL
Refills: 0 | Status: DISCONTINUED | OUTPATIENT
Start: 2021-04-19 | End: 2021-04-20

## 2021-04-19 RX ORDER — FENTANYL CITRATE 50 UG/ML
50 INJECTION INTRAVENOUS
Refills: 0 | Status: DISCONTINUED | OUTPATIENT
Start: 2021-04-19 | End: 2021-04-19

## 2021-04-19 RX ORDER — SIMVASTATIN 20 MG/1
40 TABLET, FILM COATED ORAL AT BEDTIME
Refills: 0 | Status: DISCONTINUED | OUTPATIENT
Start: 2021-04-19 | End: 2021-04-20

## 2021-04-19 RX ORDER — DEXTROSE 50 % IN WATER 50 %
15 SYRINGE (ML) INTRAVENOUS ONCE
Refills: 0 | Status: DISCONTINUED | OUTPATIENT
Start: 2021-04-19 | End: 2021-04-20

## 2021-04-19 RX ORDER — SODIUM CHLORIDE 9 MG/ML
500 INJECTION INTRAMUSCULAR; INTRAVENOUS; SUBCUTANEOUS ONCE
Refills: 0 | Status: COMPLETED | OUTPATIENT
Start: 2021-04-19 | End: 2021-04-19

## 2021-04-19 RX ORDER — ONDANSETRON 8 MG/1
4 TABLET, FILM COATED ORAL EVERY 6 HOURS
Refills: 0 | Status: DISCONTINUED | OUTPATIENT
Start: 2021-04-19 | End: 2021-04-20

## 2021-04-19 RX ADMIN — SODIUM CHLORIDE 3 MILLILITER(S): 9 INJECTION INTRAMUSCULAR; INTRAVENOUS; SUBCUTANEOUS at 21:27

## 2021-04-19 RX ADMIN — Medication 325 MILLIGRAM(S): at 17:13

## 2021-04-19 RX ADMIN — Medication 100 MILLIGRAM(S): at 14:47

## 2021-04-19 RX ADMIN — Medication 4: at 17:13

## 2021-04-19 RX ADMIN — SODIUM CHLORIDE 500 MILLILITER(S): 9 INJECTION INTRAMUSCULAR; INTRAVENOUS; SUBCUTANEOUS at 13:38

## 2021-04-19 RX ADMIN — OXYCODONE HYDROCHLORIDE 5 MILLIGRAM(S): 5 TABLET ORAL at 13:42

## 2021-04-19 RX ADMIN — SIMVASTATIN 40 MILLIGRAM(S): 20 TABLET, FILM COATED ORAL at 21:12

## 2021-04-19 RX ADMIN — SENNA PLUS 2 TABLET(S): 8.6 TABLET ORAL at 21:11

## 2021-04-19 RX ADMIN — SODIUM CHLORIDE 75 MILLILITER(S): 9 INJECTION, SOLUTION INTRAVENOUS at 11:00

## 2021-04-19 RX ADMIN — GABAPENTIN 100 MILLIGRAM(S): 400 CAPSULE ORAL at 14:46

## 2021-04-19 RX ADMIN — GABAPENTIN 100 MILLIGRAM(S): 400 CAPSULE ORAL at 21:12

## 2021-04-19 RX ADMIN — OXYCODONE HYDROCHLORIDE 5 MILLIGRAM(S): 5 TABLET ORAL at 21:56

## 2021-04-19 RX ADMIN — OXYCODONE HYDROCHLORIDE 5 MILLIGRAM(S): 5 TABLET ORAL at 14:45

## 2021-04-19 RX ADMIN — TRAMADOL HYDROCHLORIDE 50 MILLIGRAM(S): 50 TABLET ORAL at 06:43

## 2021-04-19 RX ADMIN — SODIUM CHLORIDE 500 MILLILITER(S): 9 INJECTION INTRAMUSCULAR; INTRAVENOUS; SUBCUTANEOUS at 10:00

## 2021-04-19 RX ADMIN — Medication 975 MILLIGRAM(S): at 06:43

## 2021-04-19 RX ADMIN — OXYCODONE HYDROCHLORIDE 5 MILLIGRAM(S): 5 TABLET ORAL at 21:26

## 2021-04-19 RX ADMIN — SODIUM CHLORIDE 3 MILLILITER(S): 9 INJECTION INTRAMUSCULAR; INTRAVENOUS; SUBCUTANEOUS at 13:38

## 2021-04-19 RX ADMIN — POLYETHYLENE GLYCOL 3350 17 GRAM(S): 17 POWDER, FOR SOLUTION ORAL at 21:11

## 2021-04-19 RX ADMIN — Medication 400 MILLIGRAM(S): at 23:00

## 2021-04-19 RX ADMIN — Medication 100 MILLIGRAM(S): at 22:10

## 2021-04-19 RX ADMIN — PANTOPRAZOLE SODIUM 40 MILLIGRAM(S): 20 TABLET, DELAYED RELEASE ORAL at 06:42

## 2021-04-19 NOTE — CONSULT NOTE ADULT - PROBLEM SELECTOR RECOMMENDATION 4
Normocytic, Hb 10.9 in March  - trend---tranfuse for Hb <7, bleeding or symptoms  - will need OP f/u for anemia with PCP

## 2021-04-19 NOTE — CONSULT NOTE ADULT - SUBJECTIVE AND OBJECTIVE BOX
HPI: 71F with HTN, HLD and DM2 with R knee OA now s/p scheduled R TKA with Miguel robot on 4/19/21.  Had R knee pain x2 years that is getting progressively worse despite conservative measures (PT and injections).     Allergies: No Known Allergies  MEDICATIONS  (STANDING):  acetaminophen IVPB 1000 milliGRAM(s) IV Intermittent once  aspirin 325 milliGRAM(s) Oral two times a day  ceFAZolin   IVPB 2000 milliGRAM(s) IV Intermittent every 8 hours  gabapentin 100 milliGRAM(s) Oral three times a day  insulin lispro (ADMELOG) corrective regimen sliding scale   SubCutaneous three times a day before meals  insulin lispro (ADMELOG) corrective regimen sliding scale   SubCutaneous at bedtime  ketorolac   Injectable 15 milliGRAM(s) IV Push every 6 hours  lactated ringers. 1000 milliLiter(s) (75 mL/Hr) IV Continuous <Continuous>  losartan 100 milliGRAM(s) Oral daily  pantoprazole    Tablet 40 milliGRAM(s) Oral before breakfast  polyethylene glycol 3350 17 Gram(s) Oral at bedtime  senna 2 Tablet(s) Oral at bedtime  simvastatin 40 milliGRAM(s) Oral at bedtime  sodium chloride 0.9% lock flush 3 milliLiter(s) IV Push every 8 hours    MEDICATIONS  (PRN):  HYDROmorphone  Injectable 0.5 milliGRAM(s) IV Push once PRN Severe breakthrough pain control  magnesium hydroxide Suspension 30 milliLiter(s) Oral daily PRN Constipation  ondansetron Injectable 4 milliGRAM(s) IV Push every 6 hours PRN Nausea and/or Vomiting  oxyCODONE    IR 5 milliGRAM(s) Oral every 3 hours PRN Moderate Pain (4 - 6)  oxyCODONE    IR 10 milliGRAM(s) Oral every 3 hours PRN Severe Pain (7 - 10)  oxyCODONE    IR 2.5 milliGRAM(s) Oral every 3 hours PRN Mild Pain (1 - 3)  traMADol 50 milliGRAM(s) Oral every 8 hours PRN Mild Pain (1 - 3)    PAST MEDICAL & SURGICAL HISTORY:  HTN (hypertension)  HLD (hyperlipidemia)  Type 2 diabetes mellitus  Unilateral primary osteoarthritis, right knee  Graves disease 10 years ago  S/P colonoscopy    FAMILY HISTORY:  FH: HTN (hypertension) (Mother, Sibling)    Social History:   , retired   never smoker, social EtOH, no drugs     Review of Systems:   CONSTITUTIONAL: No fever, weight loss, or fatigue  EYES: No eye pain, visual disturbances, or discharge  ENMT:  No difficulty hearing, tinnitus, vertigo; No sinus or throat pain  NECK: No pain or stiffness  RESPIRATORY: No cough, wheezing, chills or hemoptysis; No shortness of breath  CARDIOVASCULAR: No chest pain, palpitations, dizziness, or leg swelling  GASTROINTESTINAL: No abdominal or epigastric pain. No nausea, vomiting, or hematemesis; No diarrhea or constipation. No melena or hematochezia.  GENITOURINARY: No dysuria, frequency, hematuria, or incontinence  NEUROLOGICAL: No headaches, memory loss, loss of strength, numbness, or tremors  SKIN: No itching, burning, rashes, or lesions   LYMPH NODES: No enlarged glands  ENDOCRINE: No heat or cold intolerance; No hair loss  PSYCHIATRIC: No depression, anxiety, mood swings, or difficulty sleeping  HEME/LYMPH: No easy bruising, or bleeding gums  ALLERGY AND IMMUNOLOGIC: No hives or eczema    T(C): 36.8 (04-19-21 @ 13:45), Max: 36.8 (04-19-21 @ 13:45)  HR: 70 (04-19-21 @ 13:45) (64 - 91)  BP: 149/84 (04-19-21 @ 13:45) (125/91 - 156/88)  RR: 19 (04-19-21 @ 13:45) (13 - 20)  SpO2: 98% (04-19-21 @ 13:45) (95% - 100%)    CAPILLARY BLOOD GLUCOSE  POCT Blood Glucose.: 126 mg/dL (19 Apr 2021 10:57)  POCT Blood Glucose.: 119 mg/dL (19 Apr 2021 06:14)    I&O's Summary    19 Apr 2021 07:01  -  19 Apr 2021 14:07  --------------------------------------------------------  IN: 620 mL / OUT: 400 mL / NET: 220 mL    PHYSICAL EXAM:  GENERAL: NAD, well-developed  HEAD:  Atraumatic, Normocephalic  EYES: EOMI, PERRLA, conjunctiva and sclera clear  NECK: Supple, No JVD  CHEST/LUNG: Clear to auscultation bilaterally; no wheeze  HEART: Regular rate and rhythm; no murmurs, rubs, or gallops  ABDOMEN: Soft, Nontender, Nondistended; Bowel sounds present  EXTREMITIES:  warm and well perfused, no clubbing, cyanosis, or edema  PSYCH: AAOx3  NEUROLOGY: non-focal  SKIN: No rashes or lesions    LABS:                        10.7   9.16  )-----------( 271      ( 19 Apr 2021 10:27 )             33.7     04-19    141  |  108<H>  |  13  ----------------------------<  134<H>  4.5   |  23  |  1.09    Ca    9.3      19 Apr 2021 10:26    CT R knee  Moderate degenerative changes noted of the right hip. Moderate degenerative changes seen at the pubic symphysis. There is mild degenerative change of the right ankle with enthesophyte formation at the Achilles insertion. There is circumferential soft tissue swelling of the right ankle.  There is moderate to severe medial compartment osteoarthrosis of the knee with moderate lateral and patellofemoral arthrosis. There is a physiologic amount joint fluid. Enthesophyte formation is seen at both poles of the patella and at the ischial tuberosity.  There is no fracture or dislocation.  Vascular calcification is present.    IMPRESSION:  Tricompartmental osteoarthrosis of the right knee that is most severe in the medial compartment.  Imaging was performed as per [facility] protocol.    Notes Reviewed:  ortho PA   Care Discussed with Consultants/Other Providers: ortho PA     HPI: 71F with HTN, HLD and DM2 with R knee OA now s/p scheduled R TKA with Miguel robot on 4/19/21.  Had R knee pain x2 years that is getting progressively worse despite conservative measures (PT and injections).   Patient reports knees have been issue for ~2 years, R>L  and that is why she proceeded for surgery as limited functional status.  In regards to DM2 diagnosed at age 66, never on insulin, reports no retinopathy or neuropathy.  In regards to HTN reports started taking meds at same time as DM diagnosis.  For Graves reports was on a "medication for over 1 year" and is not clear if she had VILLEGAS, reports has been off meds and gets her blood work done q6m and told things are OK.      Patient is feeling well post-op.  Pain is controlled, able to walk with PT.  Reports voided on arrival to 9T, tolerated lunch.  No CP, SOB, f/c/n/v.  Reports BM prior to surgery on 4/18.   Reports lives in an apt, 5 stairs to get in and then no stairs in the home.     Allergies: No Known Allergies    MEDICATIONS  (STANDING):  acetaminophen IVPB 1000 milliGRAM(s) IV Intermittent once  aspirin 325 milliGRAM(s) Oral two times a day  ceFAZolin   IVPB 2000 milliGRAM(s) IV Intermittent every 8 hours  gabapentin 100 milliGRAM(s) Oral three times a day  insulin lispro (ADMELOG) corrective regimen sliding scale   SubCutaneous three times a day before meals  insulin lispro (ADMELOG) corrective regimen sliding scale   SubCutaneous at bedtime  ketorolac   Injectable 15 milliGRAM(s) IV Push every 6 hours  lactated ringers. 1000 milliLiter(s) (75 mL/Hr) IV Continuous <Continuous>  losartan 100 milliGRAM(s) Oral daily  pantoprazole    Tablet 40 milliGRAM(s) Oral before breakfast  polyethylene glycol 3350 17 Gram(s) Oral at bedtime  senna 2 Tablet(s) Oral at bedtime  simvastatin 40 milliGRAM(s) Oral at bedtime  sodium chloride 0.9% lock flush 3 milliLiter(s) IV Push every 8 hours    MEDICATIONS  (PRN):  HYDROmorphone  Injectable 0.5 milliGRAM(s) IV Push once PRN Severe breakthrough pain control  magnesium hydroxide Suspension 30 milliLiter(s) Oral daily PRN Constipation  ondansetron Injectable 4 milliGRAM(s) IV Push every 6 hours PRN Nausea and/or Vomiting  oxyCODONE    IR 5 milliGRAM(s) Oral every 3 hours PRN Moderate Pain (4 - 6)  oxyCODONE    IR 10 milliGRAM(s) Oral every 3 hours PRN Severe Pain (7 - 10)  oxyCODONE    IR 2.5 milliGRAM(s) Oral every 3 hours PRN Mild Pain (1 - 3)  traMADol 50 milliGRAM(s) Oral every 8 hours PRN Mild Pain (1 - 3)    PAST MEDICAL & SURGICAL HISTORY:  HTN (hypertension)  HLD (hyperlipidemia)  Type 2 diabetes mellitus  Unilateral primary osteoarthritis, right knee  Graves disease 10 years ago  S/P colonoscopy    FAMILY HISTORY:  FH: HTN (hypertension) (Mother, Sibling)    Social History:   , retired   never smoker, social EtOH, no drugs     Review of Systems:   CONSTITUTIONAL: No fever, weight loss, or fatigue  EYES: No eye pain, visual disturbances, or discharge  ENMT:  No difficulty hearing, tinnitus, vertigo; No sinus or throat pain  NECK: No pain or stiffness  RESPIRATORY: No cough, wheezing, chills or hemoptysis; No shortness of breath  CARDIOVASCULAR: No chest pain, palpitations, dizziness, or leg swelling  GASTROINTESTINAL: No abdominal or epigastric pain. No nausea, vomiting, or hematemesis; No diarrhea or constipation. No melena or hematochezia.  GENITOURINARY: No dysuria, frequency, hematuria, or incontinence  NEUROLOGICAL: No headaches, memory loss, loss of strength, numbness, or tremors  SKIN: No itching, burning, rashes, or lesions   LYMPH NODES: No enlarged glands  ENDOCRINE: No heat or cold intolerance; No hair loss  PSYCHIATRIC: No depression, anxiety, mood swings, or difficulty sleeping  HEME/LYMPH: No easy bruising, or bleeding gums  ALLERGY AND IMMUNOLOGIC: No hives or eczema    T(C): 36.8 (04-19-21 @ 13:45), Max: 36.8 (04-19-21 @ 13:45)  HR: 70 (04-19-21 @ 13:45) (64 - 91)  BP: 149/84 (04-19-21 @ 13:45) (125/91 - 156/88)  RR: 19 (04-19-21 @ 13:45) (13 - 20)  SpO2: 98% (04-19-21 @ 13:45) (95% - 100%)    CAPILLARY BLOOD GLUCOSE  POCT Blood Glucose.: 126 mg/dL (19 Apr 2021 10:57)  POCT Blood Glucose.: 119 mg/dL (19 Apr 2021 06:14)    I&O's Summary    19 Apr 2021 07:01  -  19 Apr 2021 14:07  --------------------------------------------------------  IN: 620 mL / OUT: 400 mL / NET: 220 mL    PHYSICAL EXAM:  GENERAL: NAD, obese   HEAD:  Atraumatic, Normocephalic  EYES: EOMI, PERRLA, conjunctiva and sclera clear  NECK: Supple, No JVD  CHEST/LUNG: Clear to auscultation bilaterally; no wheeze  HEART: Regular rate and rhythm; no murmurs, rubs, or gallops  ABDOMEN: Soft, Nontender, Nondistended; Bowel sounds present  EXTREMITIES:  warm and well perfused, no clubbing, cyanosis, or edema; R knee incision with dressing c/d/i  PSYCH: AAOx3  NEUROLOGY: non-focal  SKIN: No rashes or lesions  + SCDs    LABS:                        10.7   9.16  )-----------( 271      ( 19 Apr 2021 10:27 )             33.7     04-19    141  |  108<H>  |  13  ----------------------------<  134<H>  4.5   |  23  |  1.09    Ca    9.3      19 Apr 2021 10:26    CT R knee  Moderate degenerative changes noted of the right hip. Moderate degenerative changes seen at the pubic symphysis. There is mild degenerative change of the right ankle with enthesophyte formation at the Achilles insertion. There is circumferential soft tissue swelling of the right ankle.  There is moderate to severe medial compartment osteoarthrosis of the knee with moderate lateral and patellofemoral arthrosis. There is a physiologic amount joint fluid. Enthesophyte formation is seen at both poles of the patella and at the ischial tuberosity.  There is no fracture or dislocation.  Vascular calcification is present.    IMPRESSION:  Tricompartmental osteoarthrosis of the right knee that is most severe in the medial compartment.  Imaging was performed as per [facility] protocol.    Notes Reviewed:  ortho PA   Care Discussed with Consultants/Other Providers: ortho PA

## 2021-04-19 NOTE — PHYSICAL THERAPY INITIAL EVALUATION ADULT - REHAB POTENTIAL, PT EVAL
good, to achieve stated therapy goals Spine appears normal, range of motion is not limited, no muscle or joint tenderness

## 2021-04-19 NOTE — PHYSICAL THERAPY INITIAL EVALUATION ADULT - ADDITIONAL COMMENTS
Pt. reports owning a straight cane and rollator.    Pt. was left in bed post PT Evaluation, no apparent distress, all lines intact, call bell within reach.

## 2021-04-19 NOTE — DISCHARGE NOTE NURSING/CASE MANAGEMENT/SOCIAL WORK - PATIENT PORTAL LINK FT
You can access the FollowMyHealth Patient Portal offered by [facility] by registering at the following website: [email or web address]. By joining [facility]’s FollowMyHealth portal, you will also be able to view your health information using other applications (apps) compatible with our system.

## 2021-04-19 NOTE — BRIEF OPERATIVE NOTE - NSICDXBRIEFPROCEDURE_GEN_ALL_CORE_FT
PROCEDURES:  Robot-assisted total replacement of right knee joint using computer-assisted navigation with computed tomography planning 19-Apr-2021 09:51:14  Delvin Srivastava

## 2021-04-19 NOTE — DISCHARGE NOTE NURSING/CASE MANAGEMENT/SOCIAL WORK - NSDPDISTO_GEN_ALL_CORE
stable, right knee dressing in place clean dry and intact, tolerating diet, voiding well, oob with walker/Home with home care

## 2021-04-19 NOTE — PATIENT PROFILE ADULT - TRANSPORTATION
"7/10/2019       RE: Isiah Reina   [facility] [address]     Dear Colleague,    Thank you for referring your patient, Isiah Reina, to the [facility] at [facility]. Please see a copy of my visit note below.    Subjective:      37 year old  at 26w2d presents for Mindfulness Group Prenatal Care Session #2 with her partner Mikhail.     Denies vaginal bleeding or leakage of fluid.  No contractions. Good fetal movement.   No HA, visual changes, RUQ or epigastric pain.     Patient concerns: Feeling well overall.  - Discussed need for GTT. Order placed.    Objective:  Vitals:    07/10/19 2028   BP: 117/78   Pulse: 60   Weight: 83.9 kg (184 lb 14.4 oz)   Height: 1.626 m (5' 4.02\")    See ob flowsheet  Assessment/Plan     Encounter Diagnoses   Name Primary?     Nausea/vomiting in pregnancy Yes     Antepartum multigravida of advanced maternal age - [facility] CNM      Positive GBS test      Pregnancy resulting from in vitro fertilization, antepartum      External hemorrhoids      Abnormal glucose in pregnancy, antepartum        Orders Placed This Encounter   Medications     docusate sodium (COLACE) 50 MG capsule     Si capsule daily     ondansetron (ZOFRAN ODT) 4 MG ODT tab     Sig: Take 2 tablets (8 mg) by mouth every 8 hours as needed for nausea     Dispense:  10 tablet     Refill:  3     ABO   Date Value Ref Range Status   2019 A  Final     RH(D)   Date Value Ref Range Status   2019 Pos  Final     Antibody Screen   Date Value Ref Range Status   2019 Neg  Final      Rhogam  was not indicated.  Tdap was given.   Flu shot was not given.     -  Pt and Mikhail participated in group prenatal discussion at which time all pt's and Mikhail's questions were discussed and answered.  Pt and Mikhail participated in relaxation techniques, partner massage techniques and guided mediation for relaxation.     - Reviewed total weight gain, encouraged " continued healthy diet and exercise.      - Reviewed importance of daily fetal kick count and why/how to contact provider.    - Reviewed why/how to contact provider if headache/visual changes/RUQ or epigastric pain, decreased fetal movement, vaginal bleeding, leakage of fluid or more than 4 contractions in an hour.     Patient education/orders or handouts today:PTL signs/symptoms, birth control, breastfeeding, and  support.     Reviewed EOB labs - failed 1 hour. Spent ~10 minutes after group one on one with midwife (and her partner) allowing space to voice concerns and frustrations around possibility of diabetes.  Plans Zofran before 3 hour to help with possible nausea.     Return to clinic in 4 weeks for next Mindfulness Prenatal Group and prn if questions or concerns.     I, JESSE Villalobos am serving as a scribe; to document services personally performed by  Jazmín Sharpe CNM based on data collection and the provider's statements to me.     JESSE Villalobos        The encounter was performed by me and scribed by the SNM. The scribed note accurately reflects my personal services and decisions made by me.  Jazmín RAI CNM     no

## 2021-04-19 NOTE — CONSULT NOTE ADULT - PROBLEM SELECTOR RECOMMENDATION 3
Well controlled, HbA1c 7.2% which acceptable for age  - hold home orals until dc  - c/w DM diet, BG checks and GIA

## 2021-04-19 NOTE — DISCHARGE NOTE NURSING/CASE MANAGEMENT/SOCIAL WORK - NSDCPNINST_GEN_ALL_CORE
You have a postop appointment with Dr Raza on  You have a postop appointment with Dr Raza on May 4th 2021 @ 1:00pm, please keep postop dressing in place until this time.  Notify Dr Raza if you experience any increase in pain not relieved with pain medication, any redness, drainage or swelling around incision or if you have a fever>100.5.  Drink plenty of fluids.  No heavy lifting or straining.  Continue to elevate your leg and do exercises as instructed.  Continue to follow consistent carb diet and follow up with PMD for continued management of your diabetes.  Use over the counter stool softeners to assist with constipation which can be a side effect of narcotic pain medication.

## 2021-04-19 NOTE — ASU PREOP CHECKLIST - SELECT TESTS ORDERED
fs=19 urine culture,HGBA1C/BMP/CBC/Type and Cross/Type and Screen/Urinalysis/EKG/POCT Blood Glucose/COVID-19

## 2021-04-19 NOTE — OCCUPATIONAL THERAPY INITIAL EVALUATION ADULT - PERTINENT HX OF CURRENT PROBLEM, REHAB EVAL
Pt is a 72 y/o female with HTN, HLD and Type 2 DM presents to PST preop for right total knee replacement with Miguel robot on 4/19/21. pt reports right knee pain x2 years that is getting progressively worse. pt states despite conservative measures (PT and injections), right knee pain persists.

## 2021-04-19 NOTE — ASU PREOP CHECKLIST - BLOOD AVAILABLE
CT sent/yes pt feels comfortable. denies pain. No active bleeding. Tolerating diet. Vital signs stable. Dressing dry, clean, and intact. Fully awake, alert and oriented x 4

## 2021-04-20 ENCOUNTER — TRANSCRIPTION ENCOUNTER (OUTPATIENT)
Age: 71
End: 2021-04-20

## 2021-04-20 VITALS
OXYGEN SATURATION: 99 % | DIASTOLIC BLOOD PRESSURE: 78 MMHG | SYSTOLIC BLOOD PRESSURE: 146 MMHG | TEMPERATURE: 98 F | HEART RATE: 99 BPM | RESPIRATION RATE: 18 BRPM

## 2021-04-20 LAB
ANION GAP SERPL CALC-SCNC: 10 MMOL/L — SIGNIFICANT CHANGE UP (ref 7–14)
BUN SERPL-MCNC: 20 MG/DL — SIGNIFICANT CHANGE UP (ref 7–23)
CALCIUM SERPL-MCNC: 9.2 MG/DL — SIGNIFICANT CHANGE UP (ref 8.4–10.5)
CHLORIDE SERPL-SCNC: 103 MMOL/L — SIGNIFICANT CHANGE UP (ref 98–107)
CO2 SERPL-SCNC: 23 MMOL/L — SIGNIFICANT CHANGE UP (ref 22–31)
COVID-19 SPIKE DOMAIN AB INTERP: POSITIVE
COVID-19 SPIKE DOMAIN ANTIBODY RESULT: >250 U/ML — HIGH
CREAT SERPL-MCNC: 1 MG/DL — SIGNIFICANT CHANGE UP (ref 0.5–1.3)
GLUCOSE BLDC GLUCOMTR-MCNC: 143 MG/DL — HIGH (ref 70–99)
GLUCOSE BLDC GLUCOMTR-MCNC: 156 MG/DL — HIGH (ref 70–99)
GLUCOSE SERPL-MCNC: 147 MG/DL — HIGH (ref 70–99)
HCT VFR BLD CALC: 32.3 % — LOW (ref 34.5–45)
HGB BLD-MCNC: 10.3 G/DL — LOW (ref 11.5–15.5)
MCHC RBC-ENTMCNC: 28.3 PG — SIGNIFICANT CHANGE UP (ref 27–34)
MCHC RBC-ENTMCNC: 31.9 GM/DL — LOW (ref 32–36)
MCV RBC AUTO: 88.7 FL — SIGNIFICANT CHANGE UP (ref 80–100)
NRBC # BLD: 0 /100 WBCS — SIGNIFICANT CHANGE UP
NRBC # FLD: 0 K/UL — SIGNIFICANT CHANGE UP
PLATELET # BLD AUTO: 283 K/UL — SIGNIFICANT CHANGE UP (ref 150–400)
POTASSIUM SERPL-MCNC: 4.6 MMOL/L — SIGNIFICANT CHANGE UP (ref 3.5–5.3)
POTASSIUM SERPL-SCNC: 4.6 MMOL/L — SIGNIFICANT CHANGE UP (ref 3.5–5.3)
RBC # BLD: 3.64 M/UL — LOW (ref 3.8–5.2)
RBC # FLD: 13.6 % — SIGNIFICANT CHANGE UP (ref 10.3–14.5)
SARS-COV-2 IGG+IGM SERPL QL IA: >250 U/ML — HIGH
SARS-COV-2 IGG+IGM SERPL QL IA: POSITIVE
SODIUM SERPL-SCNC: 136 MMOL/L — SIGNIFICANT CHANGE UP (ref 135–145)
WBC # BLD: 11.27 K/UL — HIGH (ref 3.8–10.5)
WBC # FLD AUTO: 11.27 K/UL — HIGH (ref 3.8–10.5)

## 2021-04-20 PROCEDURE — 99232 SBSQ HOSP IP/OBS MODERATE 35: CPT

## 2021-04-20 RX ORDER — SENNA PLUS 8.6 MG/1
2 TABLET ORAL
Qty: 60 | Refills: 0
Start: 2021-04-20 | End: 2021-05-19

## 2021-04-20 RX ORDER — ACETAMINOPHEN 500 MG
975 TABLET ORAL EVERY 8 HOURS
Refills: 0 | Status: DISCONTINUED | OUTPATIENT
Start: 2021-04-20 | End: 2021-04-20

## 2021-04-20 RX ORDER — GABAPENTIN 400 MG/1
1 CAPSULE ORAL
Qty: 90 | Refills: 0
Start: 2021-04-20 | End: 2021-05-19

## 2021-04-20 RX ORDER — ASPIRIN/CALCIUM CARB/MAGNESIUM 324 MG
1 TABLET ORAL
Qty: 84 | Refills: 0
Start: 2021-04-20 | End: 2021-05-31

## 2021-04-20 RX ORDER — ASPIRIN/CALCIUM CARB/MAGNESIUM 324 MG
1 TABLET ORAL
Qty: 0 | Refills: 0 | DISCHARGE

## 2021-04-20 RX ORDER — TRAMADOL HYDROCHLORIDE 50 MG/1
1 TABLET ORAL
Qty: 21 | Refills: 0
Start: 2021-04-20 | End: 2021-04-26

## 2021-04-20 RX ORDER — PANTOPRAZOLE SODIUM 20 MG/1
1 TABLET, DELAYED RELEASE ORAL
Qty: 45 | Refills: 0
Start: 2021-04-20 | End: 2021-06-03

## 2021-04-20 RX ORDER — TRAMADOL HYDROCHLORIDE 50 MG/1
50 TABLET ORAL EVERY 8 HOURS
Refills: 0 | Status: DISCONTINUED | OUTPATIENT
Start: 2021-04-20 | End: 2021-04-20

## 2021-04-20 RX ORDER — OXYCODONE HYDROCHLORIDE 5 MG/1
2 TABLET ORAL
Qty: 56 | Refills: 0
Start: 2021-04-20 | End: 2021-04-26

## 2021-04-20 RX ORDER — DOCUSATE SODIUM 100 MG
1 CAPSULE ORAL
Qty: 90 | Refills: 0
Start: 2021-04-20 | End: 2021-05-19

## 2021-04-20 RX ORDER — ACETAMINOPHEN 500 MG
3 TABLET ORAL
Qty: 63 | Refills: 3
Start: 2021-04-20 | End: 2021-05-17

## 2021-04-20 RX ADMIN — OXYCODONE HYDROCHLORIDE 5 MILLIGRAM(S): 5 TABLET ORAL at 06:50

## 2021-04-20 RX ADMIN — Medication 15 MILLIGRAM(S): at 12:12

## 2021-04-20 RX ADMIN — Medication 325 MILLIGRAM(S): at 06:50

## 2021-04-20 RX ADMIN — OXYCODONE HYDROCHLORIDE 5 MILLIGRAM(S): 5 TABLET ORAL at 15:46

## 2021-04-20 RX ADMIN — OXYCODONE HYDROCHLORIDE 5 MILLIGRAM(S): 5 TABLET ORAL at 07:20

## 2021-04-20 RX ADMIN — OXYCODONE HYDROCHLORIDE 5 MILLIGRAM(S): 5 TABLET ORAL at 11:35

## 2021-04-20 RX ADMIN — LOSARTAN POTASSIUM 100 MILLIGRAM(S): 100 TABLET, FILM COATED ORAL at 06:50

## 2021-04-20 RX ADMIN — Medication 2: at 12:10

## 2021-04-20 RX ADMIN — SODIUM CHLORIDE 3 MILLILITER(S): 9 INJECTION INTRAMUSCULAR; INTRAVENOUS; SUBCUTANEOUS at 06:33

## 2021-04-20 RX ADMIN — Medication 15 MILLIGRAM(S): at 06:50

## 2021-04-20 RX ADMIN — GABAPENTIN 100 MILLIGRAM(S): 400 CAPSULE ORAL at 06:50

## 2021-04-20 RX ADMIN — PANTOPRAZOLE SODIUM 40 MILLIGRAM(S): 20 TABLET, DELAYED RELEASE ORAL at 06:50

## 2021-04-20 NOTE — DISCHARGE NOTE PROVIDER - CARE PROVIDERS DIRECT ADDRESSES
[email or web address]

## 2021-04-20 NOTE — DISCHARGE NOTE PROVIDER - CARE PROVIDER_API CALL
Aminata Raza)  Orthopaedic Surgery  [address], [address]  [place] [address]  Phone: [phone]  Fax: [phone]  Established Patient  Follow Up Time: 2 weeks

## 2021-04-20 NOTE — DISCHARGE NOTE PROVIDER - HOSPITAL COURSE
71year old female is admitted for elective Right total knee arthroplasty 4/19/2021 without any intraoperative complications.  Patient is doing well and stable for discharge.  Patient is tolerating physical therapy: weight bearing to Right leg, gait training, exercises as shown by Physical Therapy.  Keep wound dressing on as is until day of office visit.  Staples/sutures if applicable, to be removed in the office 14 days from date of surgery.  Patient is on Aspirin (MUST TAKE WITH FOOD) for anticoagulation.  Orthopaedic Surgeon Dr. Raza would like patient to call private MD/Internist for appointment postop to maintain continuity of care.  Follow up with Dr. Raza's office in 1-2 weeks.    Istop reviewed 71year old female is admitted for elective Right total knee arthroplasty 4/19/2021 without any intraoperative complications.  Patient is doing well and stable for discharge.  Patient is tolerating physical therapy: weight bearing to Right leg, gait training, exercises as shown by Physical Therapy.  Keep wound dressing on as is until day of office visit.  Staples/sutures if applicable, to be removed in the office 14 days from date of surgery.  Patient is on Aspirin (MUST TAKE WITH FOOD) for anticoagulation.  Orthopaedic Surgeon Dr. Raza would like patient to call private MD/Internist for appointment postop to maintain continuity of care.  Follow up with Dr. Raza's office in 1-2 weeks.    Istop reviewed Reference #: [id number]

## 2021-04-20 NOTE — PROGRESS NOTE ADULT - SUBJECTIVE AND OBJECTIVE BOX
Orthopedics Post-Op Check  Patient was seen and examined at bedside. Denies CP/SOB/Dizziness/N/V/D/HA. Pain is well controlled at the moment.    Vital Signs Last 24 Hrs  T(C): 36.8 (19 Apr 2021 13:45), Max: 36.8 (19 Apr 2021 13:45)  T(F): 98.2 (19 Apr 2021 13:45), Max: 98.2 (19 Apr 2021 13:45)  HR: 70 (19 Apr 2021 13:45) (64 - 91)  BP: 149/84 (19 Apr 2021 13:45) (125/91 - 156/88)  BP(mean): 102 (19 Apr 2021 11:30) (91 - 105)  RR: 19 (19 Apr 2021 13:45) (13 - 20)  SpO2: 98% (19 Apr 2021 13:45) (95% - 100%)    PHYSICAL EXAM:  General: NAD  R LE: Aquacel Dressing C/D/I.   B/L LE: Motor intact + EHL/FHL/TA/GS in the BL LE. Sensation is grossly intact distal. Extremities warm. Compartments are soft. DP 1+    Labs:                          10.7   9.16  )-----------( 271      ( 19 Apr 2021 10:27 )             33.7       04-19    141  |  108<H>  |  13  ----------------------------<  134<H>  4.5   |  23  |  1.09    Ca    9.3      19 Apr 2021 10:26        A/P: 71y y/o Female s/p R [facility] total knee arthroplasty, POD #0  -Pain control  -Antibiotic - Ancef postop  -Incentive Spirometry  -DVT prophylaxis: Venodynes/Aspirin 325mg BID  -F/U AM Labs  -PT/OT/WBAT  -Notify Orthopedics with any questions    
Ortho Progress Note  CORAZON MORALES   MRN-[id number]    71yFemale is s/p elective R TKA POD#1 w/out any c/o.  Resting comfortably sitting over edge of the bed as patient just went to bathroom w/RN, NAD, ANO x 3    Vital Signs Last 24 Hrs  T(C): 36.6 (20 Apr 2021 06:46), Max: 36.8 (19 Apr 2021 13:45)  T(F): 97.8 (20 Apr 2021 06:46), Max: 98.3 (20 Apr 2021 01:34)  HR: 96 (20 Apr 2021 06:46) (64 - 99)  BP: 154/84 (20 Apr 2021 06:46) (125/91 - 156/85)  BP(mean): 102 (19 Apr 2021 11:30) (91 - 105)  RR: 18 (20 Apr 2021 06:46) (13 - 20)  SpO2: 98% (20 Apr 2021 06:46) (95% - 100%)  No Known Allergies      S/P elective R TKA  R knee wound aquacel dressing is C/D/I  motor RLE EHL/TA/GS 5/5 intact  sensation RLE intact to light touch                            10.7   9.16  )-----------( 271      ( 19 Apr 2021 10:27 )             33.7     04-19    141  |  108<H>  |  13  ----------------------------<  134<H>  4.5   |  23  |  1.09    Ca    9.3      19 Apr 2021 10:26          A/P Ortho Stable s/p elective R TKA POD#1  ck labs  Aspirin for DVT ppx  Physical Therapy BID: WBAT, OOB for gait training  discharge planning today when pt is cleared by physical therapy for safe home discharge
Patient is a 71y old  Female who presents with a chief complaint of elective Right quintin total knee arthroplasty 4/19/2021    SUBJECTIVE / OVERNIGHT EVENTS:    Feels well, pain was 8/10 when moving, had some trouble rising from seated position, pain meds help and pain then controlled  No CP, SOB, f/c/n/v  Voiding, no BM    MEDICATIONS  (STANDING):  aspirin 325 milliGRAM(s) Oral two times a day  gabapentin 100 milliGRAM(s) Oral three times a day  insulin lispro (ADMELOG) corrective regimen sliding scale   SubCutaneous three times a day before meals  insulin lispro (ADMELOG) corrective regimen sliding scale   SubCutaneous at bedtime  ketorolac   Injectable 15 milliGRAM(s) IV Push every 6 hours  lactated ringers. 1000 milliLiter(s) (75 mL/Hr) IV Continuous <Continuous>  losartan 100 milliGRAM(s) Oral daily  pantoprazole    Tablet 40 milliGRAM(s) Oral before breakfast  polyethylene glycol 3350 17 Gram(s) Oral at bedtime  senna 2 Tablet(s) Oral at bedtime  simvastatin 40 milliGRAM(s) Oral at bedtime  sodium chloride 0.9% lock flush 3 milliLiter(s) IV Push every 8 hours    MEDICATIONS  (PRN):  HYDROmorphone  Injectable 0.5 milliGRAM(s) IV Push once PRN Severe breakthrough pain control  magnesium hydroxide Suspension 30 milliLiter(s) Oral daily PRN Constipation  ondansetron Injectable 4 milliGRAM(s) IV Push every 6 hours PRN Nausea and/or Vomiting  oxyCODONE    IR 5 milliGRAM(s) Oral every 3 hours PRN Moderate Pain (4 - 6)  oxyCODONE    IR 10 milliGRAM(s) Oral every 3 hours PRN Severe Pain (7 - 10)  oxyCODONE    IR 2.5 milliGRAM(s) Oral every 3 hours PRN Mild Pain (1 - 3)  traMADol 50 milliGRAM(s) Oral every 8 hours PRN Mild Pain (1 - 3)    T(C): 36.7 (04-20-21 @ 08:55), Max: 36.8 (04-19-21 @ 13:45)  HR: 99 (04-20-21 @ 08:55) (70 - 99)  BP: 146/78 (04-20-21 @ 08:55) (139/82 - 154/84)  RR: 18 (04-20-21 @ 08:55) (18 - 19)  SpO2: 99% (04-20-21 @ 08:55) (96% - 99%)    CAPILLARY BLOOD GLUCOSE  POCT Blood Glucose.: 143 mg/dL (20 Apr 2021 07:33)  POCT Blood Glucose.: 222 mg/dL (19 Apr 2021 22:20)  POCT Blood Glucose.: 223 mg/dL (19 Apr 2021 17:07)    I&O's Summary    19 Apr 2021 07:01  -  20 Apr 2021 07:00  --------------------------------------------------------  IN: 620 mL / OUT: 1000 mL / NET: -380 mL    PHYSICAL EXAM:  GENERAL: NAD, obese   CHEST/LUNG: Clear to auscultation bilaterally; no wheeze  HEART: Regular rate and rhythm; no murmurs, rubs, or gallops  ABDOMEN: Soft, Nontender, Nondistended; Bowel sounds present  EXTREMITIES:  warm and well perfused, no clubbing, cyanosis, or edema; R knee incision with dressing c/d/i  PSYCH: AAOx3  NEUROLOGY: non-focal    LABS:                        10.3   11.27 )-----------( 283      ( 20 Apr 2021 07:37 )             32.3     04-20    136  |  103  |  20  ----------------------------<  147<H>  4.6   |  23  |  1.00    Ca    9.2      20 Apr 2021 07:37      Notes Reviewed:  ortho   Care Discussed with Consultants/Other Providers: ortho PA

## 2021-04-20 NOTE — DISCHARGE NOTE PROVIDER - NSDCCPCAREPLAN_GEN_ALL_CORE_FT
PRINCIPAL DISCHARGE DIAGNOSIS  Diagnosis: Primary osteoarthritis of right knee  Assessment and Plan of Treatment: 71year old female is admitted for elective Right total knee arthroplasty 4/19/2021 without any intraoperative complications.  Patient is doing well and stable for discharge.  Patient is tolerating physical therapy: weight bearing to Right leg, gait training, exercises as shown by Physical Therapy.  Keep wound dressing on as is until day of office visit.  Staples/sutures if applicable, to be removed in the office 14 days from date of surgery.  Patient is on Aspirin (MUST TAKE WITH FOOD) for anticoagulation.  Orthopaedic Surgeon Dr. Raza would like patient to call private MD/Internist for appointment postop to maintain continuity of care.  Follow up with Dr. Raza's office in 1-2 weeks.  Istop reviewed       PRINCIPAL DISCHARGE DIAGNOSIS  Diagnosis: Primary osteoarthritis of right knee  Assessment and Plan of Treatment: 71year old female is admitted for elective Right total knee arthroplasty 4/19/2021 without any intraoperative complications.  Patient is doing well and stable for discharge.  Patient is tolerating physical therapy: weight bearing to Right leg, gait training, exercises as shown by Physical Therapy.  Keep wound dressing on as is until day of office visit.  Staples/sutures if applicable, to be removed in the office 14 days from date of surgery.  Patient is on Aspirin (MUST TAKE WITH FOOD) for anticoagulation.  Orthopaedic Surgeon Dr. Raza would like patient to call private MD/Internist for appointment postop to maintain continuity of care.  Follow up with Dr. Raza's office in 1-2 weeks.  Istop reviewed Reference #: [id number]

## 2021-04-20 NOTE — PROGRESS NOTE ADULT - REASON FOR ADMISSION
Right [facility] total knee arthroplasty
R knee OA
elective Right quintin total knee arthroplasty 4/19/2021

## 2021-04-20 NOTE — DISCHARGE NOTE PROVIDER - NSDCCPTREATMENT_GEN_ALL_CORE_FT
PRINCIPAL PROCEDURE  Procedure: Robot-assisted total replacement of right knee joint using computer-assisted navigation with computed tomography planning  Findings and Treatment: dictated operative note  weight bearing as tolerated to Right leg  call Surgeon's office to make appointment for 1-2 weeks from date of surgery Dr. Raza [phone]

## 2021-04-20 NOTE — PROGRESS NOTE ADULT - PROBLEM SELECTOR PLAN 4
Normocytic, Hb 10.9 in March  - trend---transfuse for Hb <7, bleeding or symptoms  - will need OP f/u for anemia with PCP.

## 2021-04-20 NOTE — PROGRESS NOTE ADULT - PROBLEM SELECTOR PLAN 3
Well controlled, HbA1c 7.2% which acceptable for age  - hold home orals until dc  - c/w DM diet, BG checks and GIA.

## 2021-04-20 NOTE — PROGRESS NOTE ADULT - PROBLEM SELECTOR PLAN 1
R knee OA.  CT shows: moderate to severe medial compartment osteoarthrosis of the knee with moderate lateral and patellofemoral arthrosis.  Now s/p R TKA with [facility] on 4/19.  - c/w wound and post-op care per ortho team  - pain control and bowel regimen  - OOB, incentive spirometry (educated on use)  - c/w PT

## 2021-04-20 NOTE — DISCHARGE NOTE PROVIDER - NSDCMRMEDTOKEN_GEN_ALL_CORE_FT
aspirin 81 mg oral tablet: 1 tab(s) orally once a day.last dose 4/12/21  losartan 100 mg oral tablet: 1 tab(s) orally once a day  metFORMIN 500 mg oral tablet: 1 tab(s) orally 2 times a day  simvastatin 40 mg oral tablet: 1 tab(s) orally once a day (at bedtime)   acetaminophen 325 mg oral tablet: 3 tab(s) orally every 8 hours standing around the clock until follow up with Ortho Surgeon MDD:TWELVE TABS  aspirin 325 mg oral delayed release tablet: 1 tab(s) orally 2 times a day (with meals)   Colace 100 mg oral capsule: 1 cap(s) orally 3 times a day   gabapentin 100 mg oral capsule: 1 cap(s) orally 3 times a day standing around the clock  begin tapering off as pain decreases MDD:THREE CAPS  losartan 100 mg oral tablet: 1 tab(s) orally once a day  metFORMIN 500 mg oral tablet: 1 tab(s) orally 2 times a day  oxyCODONE 5 mg oral tablet: 1 tab for Moderate pain or 2 tabs for Severe pain as needed orally every 4-6 hours  begin tapering off as pain decreases MDD:EIGHT TABS  pantoprazole 40 mg oral delayed release tablet: 1 tab(s) orally once a day (in the morning)   senna oral tablet: 2 tab(s) orally once a day (at bedtime)  simvastatin 40 mg oral tablet: 1 tab(s) orally once a day (at bedtime)  traMADol 50 mg oral tablet: 1 tab(s) orally every 8 hours take regularly (baseline control of mild pain)  begin tapering off as pain decreases MDD:THREE TABS

## 2021-05-04 ENCOUNTER — APPOINTMENT (OUTPATIENT)
Dept: ORTHOPEDIC SURGERY | Facility: CLINIC | Age: 71
End: 2021-05-04
Payer: MEDICARE

## 2021-05-04 PROCEDURE — 99024 POSTOP FOLLOW-UP VISIT: CPT

## 2021-07-18 ENCOUNTER — FORM ENCOUNTER (OUTPATIENT)
Age: 71
End: 2021-07-18

## 2021-08-05 ENCOUNTER — APPOINTMENT (OUTPATIENT)
Dept: ORTHOPEDIC SURGERY | Facility: CLINIC | Age: 71
End: 2021-08-05
Payer: MEDICARE

## 2021-08-05 VITALS
WEIGHT: 220 LBS | DIASTOLIC BLOOD PRESSURE: 87 MMHG | HEART RATE: 54 BPM | BODY MASS INDEX: 33.34 KG/M2 | HEIGHT: 68 IN | SYSTOLIC BLOOD PRESSURE: 153 MMHG

## 2021-08-05 PROCEDURE — 99215 OFFICE O/P EST HI 40 MIN: CPT

## 2021-08-05 PROCEDURE — 73562 X-RAY EXAM OF KNEE 3: CPT | Mod: LT

## 2021-08-05 PROCEDURE — 73560 X-RAY EXAM OF KNEE 1 OR 2: CPT | Mod: RT

## 2021-08-19 NOTE — DISCHARGE NOTE PROVIDER - NSDCHHWEIGHT_GEN_ALL_CORE
Called pt to schedule f/u appt, but appt was already scheduled for 9/29/2021 and not documented in chart.   Closing encounter,./mv
Last visit 09/16/2020. Next visit none    I spoke with pt on 08/13/2021 and she is awear that she is due for an appt.  Hs
Meds approved with no refills, needs an appt prior to additional refills  bianca
Yes

## 2021-10-11 ENCOUNTER — APPOINTMENT (OUTPATIENT)
Dept: ORTHOPEDIC SURGERY | Facility: HOSPITAL | Age: 71
End: 2021-10-11

## 2022-01-12 NOTE — HISTORY OF PRESENT ILLNESS
[de-identified] : DOS: 04/19/2021\par Procedure: Right Total Knee Arthroplasty, Imageless Navigation with Robotic Arm, Image Guided Navigation with Preoperative and Intraoperative CT Planning and Implant Manipulation\par  [de-identified] : Patient is postop after a Right Total Knee Arthroplasty, Imageless Navigation with Robotic Arm, Image Guided Navigation with Preoperative and Intraoperative CT Planning and Implant Manipulation. Patient presents to the office walking with the assistance of a walker. There is appropriate mild incisional tenderness, /10 in severity. The pain is [insert] in nature. Physical therapy makes the pain worse, but is improved after a short time. However, the patient is tolerating physical therapy well. Overall, the patient is seeing daily improvement in their symptoms. ADLs are slowly being resumed. Patient denies, fevers, chills, swelling, erythema, wound drainage, calf tenderness or erythema, hematomas, paresthesias, instability or buckling. The patient presents today for further treatment options.\par  [de-identified] : Patient is 2 weeks s/p Right Total Knee Arthroplasty, Imageless Navigation with Robotic Arm, Image Guided Navigation with Preoperative and Intraoperative CT Planning and Implant Manipulation\par

## 2022-05-23 ENCOUNTER — APPOINTMENT (OUTPATIENT)
Dept: ORTHOPEDIC SURGERY | Facility: CLINIC | Age: 72
End: 2022-05-23
Payer: MEDICARE

## 2022-05-23 VITALS
BODY MASS INDEX: 31.83 KG/M2 | SYSTOLIC BLOOD PRESSURE: 124 MMHG | HEIGHT: 68 IN | HEART RATE: 71 BPM | WEIGHT: 210 LBS | DIASTOLIC BLOOD PRESSURE: 78 MMHG

## 2022-05-23 PROCEDURE — 99214 OFFICE O/P EST MOD 30 MIN: CPT

## 2022-05-23 PROCEDURE — 73562 X-RAY EXAM OF KNEE 3: CPT | Mod: LT

## 2022-06-13 ENCOUNTER — APPOINTMENT (OUTPATIENT)
Dept: CT IMAGING | Facility: CLINIC | Age: 72
End: 2022-06-13
Payer: MEDICARE

## 2022-06-13 ENCOUNTER — OUTPATIENT (OUTPATIENT)
Dept: OUTPATIENT SERVICES | Facility: HOSPITAL | Age: 72
LOS: 1 days | End: 2022-06-13
Payer: COMMERCIAL

## 2022-06-13 DIAGNOSIS — Z98.890 OTHER SPECIFIED POSTPROCEDURAL STATES: Chronic | ICD-10-CM

## 2022-06-13 DIAGNOSIS — M17.12 UNILATERAL PRIMARY OSTEOARTHRITIS, LEFT KNEE: ICD-10-CM

## 2022-06-13 PROCEDURE — 73700 CT LOWER EXTREMITY W/O DYE: CPT

## 2022-06-13 PROCEDURE — 73700 CT LOWER EXTREMITY W/O DYE: CPT | Mod: 26,LT

## 2022-06-27 ENCOUNTER — OUTPATIENT (OUTPATIENT)
Dept: OUTPATIENT SERVICES | Facility: HOSPITAL | Age: 72
LOS: 1 days | End: 2022-06-27
Payer: COMMERCIAL

## 2022-06-27 VITALS
SYSTOLIC BLOOD PRESSURE: 142 MMHG | DIASTOLIC BLOOD PRESSURE: 90 MMHG | WEIGHT: 207.01 LBS | OXYGEN SATURATION: 96 % | RESPIRATION RATE: 16 BRPM | HEIGHT: 68 IN | TEMPERATURE: 98 F | HEART RATE: 63 BPM

## 2022-06-27 DIAGNOSIS — Z96.651 PRESENCE OF RIGHT ARTIFICIAL KNEE JOINT: Chronic | ICD-10-CM

## 2022-06-27 DIAGNOSIS — Z29.9 ENCOUNTER FOR PROPHYLACTIC MEASURES, UNSPECIFIED: ICD-10-CM

## 2022-06-27 DIAGNOSIS — E11.9 TYPE 2 DIABETES MELLITUS WITHOUT COMPLICATIONS: ICD-10-CM

## 2022-06-27 DIAGNOSIS — M17.12 UNILATERAL PRIMARY OSTEOARTHRITIS, LEFT KNEE: ICD-10-CM

## 2022-06-27 DIAGNOSIS — Z98.890 OTHER SPECIFIED POSTPROCEDURAL STATES: Chronic | ICD-10-CM

## 2022-06-27 DIAGNOSIS — Z01.818 ENCOUNTER FOR OTHER PREPROCEDURAL EXAMINATION: ICD-10-CM

## 2022-06-27 LAB
ANION GAP SERPL CALC-SCNC: 9 MMOL/L — SIGNIFICANT CHANGE UP (ref 5–17)
BUN SERPL-MCNC: 29 MG/DL — HIGH (ref 7–23)
CALCIUM SERPL-MCNC: 9.6 MG/DL — SIGNIFICANT CHANGE UP (ref 8.4–10.5)
CHLORIDE SERPL-SCNC: 107 MMOL/L — SIGNIFICANT CHANGE UP (ref 96–108)
CO2 SERPL-SCNC: 23 MMOL/L — SIGNIFICANT CHANGE UP (ref 22–31)
CREAT SERPL-MCNC: 1.28 MG/DL — SIGNIFICANT CHANGE UP (ref 0.5–1.3)
EGFR: 44 ML/MIN/1.73M2 — LOW
GLUCOSE SERPL-MCNC: 127 MG/DL — HIGH (ref 70–99)
HCT VFR BLD CALC: 38.4 % — SIGNIFICANT CHANGE UP (ref 34.5–45)
HGB BLD-MCNC: 12.2 G/DL — SIGNIFICANT CHANGE UP (ref 11.5–15.5)
MCHC RBC-ENTMCNC: 29.3 PG — SIGNIFICANT CHANGE UP (ref 27–34)
MCHC RBC-ENTMCNC: 31.8 GM/DL — LOW (ref 32–36)
MCV RBC AUTO: 92.3 FL — SIGNIFICANT CHANGE UP (ref 80–100)
NRBC # BLD: 0 /100 WBCS — SIGNIFICANT CHANGE UP (ref 0–0)
PLATELET # BLD AUTO: 299 K/UL — SIGNIFICANT CHANGE UP (ref 150–400)
POTASSIUM SERPL-MCNC: 4.8 MMOL/L — SIGNIFICANT CHANGE UP (ref 3.5–5.3)
POTASSIUM SERPL-SCNC: 4.8 MMOL/L — SIGNIFICANT CHANGE UP (ref 3.5–5.3)
RBC # BLD: 4.16 M/UL — SIGNIFICANT CHANGE UP (ref 3.8–5.2)
RBC # FLD: 13.8 % — SIGNIFICANT CHANGE UP (ref 10.3–14.5)
SODIUM SERPL-SCNC: 139 MMOL/L — SIGNIFICANT CHANGE UP (ref 135–145)
WBC # BLD: 10.75 K/UL — HIGH (ref 3.8–10.5)
WBC # FLD AUTO: 10.75 K/UL — HIGH (ref 3.8–10.5)

## 2022-06-27 RX ORDER — CEFAZOLIN SODIUM 1 G
2000 VIAL (EA) INJECTION ONCE
Refills: 0 | Status: COMPLETED | OUTPATIENT
Start: 2022-08-01 | End: 2022-08-01

## 2022-06-27 RX ORDER — CHLORHEXIDINE GLUCONATE 213 G/1000ML
1 SOLUTION TOPICAL ONCE
Refills: 0 | Status: DISCONTINUED | OUTPATIENT
Start: 2022-08-01 | End: 2022-08-02

## 2022-06-27 NOTE — H&P PST ADULT - REASON FOR ADMISSION
"I am having a left total knee replacement."  Patient Goal: "I am hoping to walk on my own again as I used to before."

## 2022-06-27 NOTE — H&P PST ADULT - HISTORY OF PRESENT ILLNESS
72 year old female with HTN, HLD, T2DM (HGA1C 6.6% 2/2022 as per pt) and B/L Knee OA s/p Right TKR with [facility] Robot (4/19/21) reports c/o intermittent left "piercing" pain that is worse with weight-bearing/walking. At rest, she rates her pain 2/10 and 10/10 with activity. She uses a cane to assist with walking and rollator for distances outside of the home. She has tried cortisone injections (last one 7/2021) and physical therapy with minimal relief of pain. X-rays of the left knee obtained on 5/23/22 demonstrated advanced degenerative bone-on-bone arthritis. Pt now presents to PST for scheduled Left Total Knee Replacement with [facility] Robot on 7/16/22.    Covid PCR test scheduled for 7/13/22 at [facility]; Pt advised to contact surgeon's office to schedule Covid PCR test closer to home location  Fully vaccinated with Covid Series  No recent hospitalizations over the last 3 months  Denies recent travel, exposure or Covid symptoms  No Covid + infections in the past  72 year old female with HTN, HLD, T2DM (HGA1C 6.6% 2/2022 as per pt) and B/L Knee OA s/p Right TKR with [facility] Robot (4/19/21) reports c/o intermittent left "piercing" knee pain that is worse with weight-bearing/walking. At rest, she rates her pain 2/10 and 10/10 with activity. She uses a cane to assist with walking and rollator for distances outside of the home. She has tried cortisone injections (last one 7/2021) and physical therapy with minimal relief of pain. X-rays of the left knee obtained on 5/23/22 demonstrated advanced degenerative bone-on-bone arthritis. Pt now presents to PST for scheduled Left Total Knee Replacement with [facility] Robot on 7/16/22 with Dr. Raza.    Covid PCR test scheduled for 7/13/22 at [facility]; Pt advised to contact surgeon's office to schedule Covid PCR test closer to home location  Fully vaccinated with Covid Series  No recent hospitalizations over the last 3 months  Denies recent travel, exposure or Covid symptoms  No Covid + infections in the past

## 2022-06-27 NOTE — H&P PST ADULT - ASSESSMENT
EJI VTE 2.0 SCORE [CLOT updated 2019]    AGE RELATED RISK FACTORS                                                       MOBILITY RELATED FACTORS  [ ] Age 41-60 years                                            (1 Point)                    [ ] Bed rest                                                        (1 Point)  [X ] Age: 61-74 years                                           (2 Points)                  [ ] Plaster cast                                                   (2 Points)  [ ] Age= 75 years                                              (3 Points)                    [ ] Bed bound for more than 72 hours                 (2 Points)    DISEASE RELATED RISK FACTORS                                               GENDER SPECIFIC FACTORS  [ ] Edema in the lower extremities                       (1 Point)              [ ] Pregnancy                                                     (1 Point)  [ ] Varicose veins                                               (1 Point)                     [ ] Post-partum < 6 weeks                                   (1 Point)             [X ] BMI > 25 Kg/m2                                            (1 Point)                     [ ] Hormonal therapy  or oral contraception          (1 Point)                 [ ] Sepsis (in the previous month)                        (1 Point)               [ ] History of pregnancy complications                 (1 point)  [ ] Pneumonia or serious lung disease                                               [ ] Unexplained or recurrent                     (1 Point)           (in the previous month)                               (1 Point)  [ ] Abnormal pulmonary function test                     (1 Point)                 SURGERY RELATED RISK FACTORS  [ ] Acute myocardial infarction                              (1 Point)               [ ]  Section                                             (1 Point)  [ ] Congestive heart failure (in the previous month)  (1 Point)      [ ] Minor surgery                                                  (1 Point)   [ ] Inflammatory bowel disease                             (1 Point)               [ ] Arthroscopic surgery                                        (2 Points)  [ ] Central venous access                                      (2 Points)                [ ] General surgery lasting more than 45 minutes (2 points)  [ ] Malignancy- Present or previous                   (2 Points)                [X ] Elective arthroplasty                                         (5 points)    [ ] Stroke (in the previous month)                          (5 Points)                                                                                                                                                           HEMATOLOGY RELATED FACTORS                                                 TRAUMA RELATED RISK FACTORS  [ ] Prior episodes of VTE                                     (3 Points)                [ ] Fracture of the hip, pelvis, or leg                       (5 Points)  [ ] Positive family history for VTE                         (3 Points)             [ ] Acute spinal cord injury (in the previous month)  (5 Points)  [ ] Prothrombin 09430 A                                     (3 Points)               [ ] Paralysis  (less than 1 month)                             (5 Points)  [ ] Factor V Leiden                                             (3 Points)                  [ ] Multiple Trauma within 1 month                        (5 Points)  [ ] Lupus anticoagulants                                     (3 Points)                                                           [ ] Anticardiolipin antibodies                               (3 Points)                                                       [ ] High homocysteine in the blood                      (3 Points)                                             [ ] Other congenital or acquired thrombophilia      (3 Points)                                                [ ] Heparin induced thrombocytopenia                  (3 Points)                                     Total Score [      8    ]

## 2022-06-27 NOTE — H&P PST ADULT - PROBLEM SELECTOR PLAN 2
HGA1C ordered and obtained at [facility]  FS on admit  Pt advised to hold metformin 24 hours prior to surgery with last dose on 7/15/22 AM

## 2022-06-27 NOTE — H&P PST ADULT - NSICDXPASTMEDICALHX_GEN_ALL_CORE_FT
no fever and no chills.
PAST MEDICAL HISTORY:  Borderline glaucoma B/L - Latanoprost eye gtts daily    H/O Graves' disease 10 years ago - Resolved    HLD (hyperlipidemia) on Simvastatin    HTN (hypertension) on Losartan    Type 2 diabetes mellitus on Metformin; Pt reports Most recent HGA1C 6.6% from 2/2022    Unilateral primary osteoarthritis, left knee Pending Left Total Knee Replacement with [employer] on 7/16/22 with Dr. Raza    Unilateral primary osteoarthritis, right knee s/p Total R Knee Replacement (4/19/21)

## 2022-06-27 NOTE — H&P PST ADULT - FALL HARM RISK - RISK INTERVENTIONS

## 2022-06-27 NOTE — H&P PST ADULT - NSICDXPASTSURGICALHX_GEN_ALL_CORE_FT
PAST SURGICAL HISTORY:  S/P total knee replacement, right s/p Total R Knee Replacement with [employer] (4/19/21)

## 2022-06-27 NOTE — H&P PST ADULT - PROBLEM SELECTOR PLAN 1
Pt is scheduled for a Left TKR with [employer] on 7/16/22 with Dr. Luz Marina Springer PCR test scheduled for 7/13/22 at [facility] - Pt advised to contact surgeon's office to schedule Covid PCR test closer to home  CBC, BMP, HGA1C, T/S and MRSA/MSSA ordered and obtained at [facility]  ABO, FS on admit  Pt advised to drink 20 oz. water 2 hours pre-operatively DOS with verbal teach back understanding  Pending M/E on 6/29/22  Pt advised to hold ASA 81 mg 7 days prior to surgery with last dose on 7/9/22 AM Pt is scheduled for a Left TKR with [employer] on 7/16/22 with Dr. Luz Marina Springer PCR test scheduled for 7/13/22 at [facility] - Pt advised to contact surgeon's office to schedule Covid PCR test closer to home  CBC, BMP, HGA1C, T/S and MRSA/MSSA ordered and obtained at [facility]  ABO, FS on admit  Pt advised to drink 20 oz. water 2 hours pre-operatively DOS with verbal teach back understanding  Pending M/E on 6/29/22  Pt advised to hold ASA 81 mg 7 days prior to surgery with last dose on 7/9/22 AM  ERP Medications ordered DOS

## 2022-06-28 LAB
A1C WITH ESTIMATED AVERAGE GLUCOSE RESULT: 6.8 % — HIGH (ref 4–5.6)
ESTIMATED AVERAGE GLUCOSE: 148 MG/DL — HIGH (ref 68–114)
MRSA PCR RESULT.: DETECTED
S AUREUS DNA NOSE QL NAA+PROBE: DETECTED

## 2022-06-28 RX ORDER — VANCOMYCIN HCL 1 G
1500 VIAL (EA) INTRAVENOUS ONCE
Refills: 0 | Status: DISCONTINUED | OUTPATIENT
Start: 2022-08-01 | End: 2022-08-02

## 2022-07-07 DIAGNOSIS — A49.01 METHICILLIN SUSCEPTIBLE STAPHYLOCOCCUS AUREUS INFECTION, UNSPECIFIED SITE: ICD-10-CM

## 2022-07-07 RX ORDER — MUPIROCIN 20 MG/G
2 OINTMENT TOPICAL
Qty: 1 | Refills: 0 | Status: ACTIVE | COMMUNITY
Start: 2022-07-07 | End: 1900-01-01

## 2022-07-14 LAB — SARS-COV-2 N GENE NPH QL NAA+PROBE: DETECTED

## 2022-07-16 ENCOUNTER — APPOINTMENT (OUTPATIENT)
Dept: ORTHOPEDIC SURGERY | Facility: HOSPITAL | Age: 72
End: 2022-07-16

## 2022-07-31 ENCOUNTER — TRANSCRIPTION ENCOUNTER (OUTPATIENT)
Age: 72
End: 2022-07-31

## 2022-08-01 ENCOUNTER — TRANSCRIPTION ENCOUNTER (OUTPATIENT)
Age: 72
End: 2022-08-01

## 2022-08-01 ENCOUNTER — APPOINTMENT (OUTPATIENT)
Dept: ORTHOPEDIC SURGERY | Facility: HOSPITAL | Age: 72
End: 2022-08-01

## 2022-08-01 ENCOUNTER — INPATIENT (INPATIENT)
Facility: HOSPITAL | Age: 72
LOS: 0 days | Discharge: HOME | DRG: 470 | End: 2022-08-02
Attending: ORTHOPAEDIC SURGERY | Admitting: ORTHOPAEDIC SURGERY
Payer: COMMERCIAL

## 2022-08-01 VITALS
DIASTOLIC BLOOD PRESSURE: 72 MMHG | HEART RATE: 59 BPM | SYSTOLIC BLOOD PRESSURE: 136 MMHG | TEMPERATURE: 98 F | OXYGEN SATURATION: 99 % | HEIGHT: 68 IN | WEIGHT: 207.01 LBS | RESPIRATION RATE: 18 BRPM

## 2022-08-01 DIAGNOSIS — M17.12 UNILATERAL PRIMARY OSTEOARTHRITIS, LEFT KNEE: ICD-10-CM

## 2022-08-01 DIAGNOSIS — Z96.651 PRESENCE OF RIGHT ARTIFICIAL KNEE JOINT: Chronic | ICD-10-CM

## 2022-08-01 LAB
GLUCOSE BLDC GLUCOMTR-MCNC: 117 MG/DL — HIGH (ref 70–99)
GLUCOSE BLDC GLUCOMTR-MCNC: 144 MG/DL — HIGH (ref 70–99)
GLUCOSE BLDC GLUCOMTR-MCNC: 231 MG/DL — HIGH (ref 70–99)
GLUCOSE BLDC GLUCOMTR-MCNC: 284 MG/DL — HIGH (ref 70–99)

## 2022-08-01 PROCEDURE — 86901 BLOOD TYPING SEROLOGIC RH(D): CPT

## 2022-08-01 PROCEDURE — S2900 ROBOTIC SURGICAL SYSTEM: CPT | Mod: NC

## 2022-08-01 PROCEDURE — G0463: CPT

## 2022-08-01 PROCEDURE — 86900 BLOOD TYPING SEROLOGIC ABO: CPT

## 2022-08-01 PROCEDURE — 20985 CPTR-ASST DIR MS PX: CPT

## 2022-08-01 PROCEDURE — 0055T BONE SRGRY CMPTR CT/MRI IMAG: CPT

## 2022-08-01 PROCEDURE — 27447 TOTAL KNEE ARTHROPLASTY: CPT | Mod: LT

## 2022-08-01 PROCEDURE — 86850 RBC ANTIBODY SCREEN: CPT

## 2022-08-01 PROCEDURE — 73560 X-RAY EXAM OF KNEE 1 OR 2: CPT | Mod: 26,LT

## 2022-08-01 DEVICE — MAKO BONE PIN 4MM X 110MM: Type: IMPLANTABLE DEVICE | Site: LEFT | Status: FUNCTIONAL

## 2022-08-01 DEVICE — BASEPLATE TIB TRIATHLON TRITAN SZ 4: Type: IMPLANTABLE DEVICE | Site: LEFT | Status: FUNCTIONAL

## 2022-08-01 DEVICE — COMP FEM CR CMNTLSS BEADED W/ PA SZ 4 LT: Type: IMPLANTABLE DEVICE | Site: LEFT | Status: FUNCTIONAL

## 2022-08-01 DEVICE — INSERT TIB BEARING CS X3 SZ 4 10MM: Type: IMPLANTABLE DEVICE | Site: LEFT | Status: FUNCTIONAL

## 2022-08-01 DEVICE — MAKO BONE PIN 4MM X 140MM: Type: IMPLANTABLE DEVICE | Site: LEFT | Status: FUNCTIONAL

## 2022-08-01 DEVICE — PATELLA ASYMM TRIATHLON SZ A 32X10MM: Type: IMPLANTABLE DEVICE | Site: LEFT | Status: FUNCTIONAL

## 2022-08-01 RX ORDER — LIDOCAINE HCL 20 MG/ML
0.2 VIAL (ML) INJECTION ONCE
Refills: 0 | Status: COMPLETED | OUTPATIENT
Start: 2022-08-01 | End: 2022-08-01

## 2022-08-01 RX ORDER — OXYCODONE HYDROCHLORIDE 5 MG/1
10 TABLET ORAL
Refills: 0 | Status: DISCONTINUED | OUTPATIENT
Start: 2022-08-01 | End: 2022-08-01

## 2022-08-01 RX ORDER — PANTOPRAZOLE SODIUM 20 MG/1
40 TABLET, DELAYED RELEASE ORAL ONCE
Refills: 0 | Status: COMPLETED | OUTPATIENT
Start: 2022-08-01 | End: 2022-08-01

## 2022-08-01 RX ORDER — LANOLIN ALCOHOL/MO/W.PET/CERES
3 CREAM (GRAM) TOPICAL AT BEDTIME
Refills: 0 | Status: DISCONTINUED | OUTPATIENT
Start: 2022-08-01 | End: 2022-08-01

## 2022-08-01 RX ORDER — CEFAZOLIN SODIUM 1 G
2000 VIAL (EA) INJECTION EVERY 8 HOURS
Refills: 0 | Status: DISCONTINUED | OUTPATIENT
Start: 2022-08-01 | End: 2022-08-01

## 2022-08-01 RX ORDER — SODIUM CHLORIDE 9 MG/ML
500 INJECTION, SOLUTION INTRAVENOUS ONCE
Refills: 0 | Status: COMPLETED | OUTPATIENT
Start: 2022-08-01 | End: 2022-08-01

## 2022-08-01 RX ORDER — OXYCODONE HYDROCHLORIDE 5 MG/1
10 TABLET ORAL
Refills: 0 | Status: DISCONTINUED | OUTPATIENT
Start: 2022-08-01 | End: 2022-08-02

## 2022-08-01 RX ORDER — ACETAMINOPHEN 500 MG
975 TABLET ORAL EVERY 8 HOURS
Refills: 0 | Status: DISCONTINUED | OUTPATIENT
Start: 2022-08-01 | End: 2022-08-01

## 2022-08-01 RX ORDER — TRAMADOL HYDROCHLORIDE 50 MG/1
50 TABLET ORAL ONCE
Refills: 0 | Status: DISCONTINUED | OUTPATIENT
Start: 2022-08-01 | End: 2022-08-01

## 2022-08-01 RX ORDER — KETOROLAC TROMETHAMINE 30 MG/ML
15 SYRINGE (ML) INJECTION EVERY 6 HOURS
Refills: 0 | Status: DISCONTINUED | OUTPATIENT
Start: 2022-08-01 | End: 2022-08-02

## 2022-08-01 RX ORDER — ASPIRIN/CALCIUM CARB/MAGNESIUM 324 MG
325 TABLET ORAL
Refills: 0 | Status: DISCONTINUED | OUTPATIENT
Start: 2022-08-01 | End: 2022-08-02

## 2022-08-01 RX ORDER — INSULIN LISPRO 100/ML
VIAL (ML) SUBCUTANEOUS
Refills: 0 | Status: DISCONTINUED | OUTPATIENT
Start: 2022-08-01 | End: 2022-08-02

## 2022-08-01 RX ORDER — INSULIN LISPRO 100/ML
VIAL (ML) SUBCUTANEOUS AT BEDTIME
Refills: 0 | Status: DISCONTINUED | OUTPATIENT
Start: 2022-08-01 | End: 2022-08-01

## 2022-08-01 RX ORDER — KETOROLAC TROMETHAMINE 30 MG/ML
15 SYRINGE (ML) INJECTION EVERY 6 HOURS
Refills: 0 | Status: DISCONTINUED | OUTPATIENT
Start: 2022-08-01 | End: 2022-08-01

## 2022-08-01 RX ORDER — SODIUM CHLORIDE 9 MG/ML
1000 INJECTION, SOLUTION INTRAVENOUS
Refills: 0 | Status: DISCONTINUED | OUTPATIENT
Start: 2022-08-01 | End: 2022-08-01

## 2022-08-01 RX ORDER — ACETAMINOPHEN 500 MG
1000 TABLET ORAL ONCE
Refills: 0 | Status: COMPLETED | OUTPATIENT
Start: 2022-08-01 | End: 2022-08-01

## 2022-08-01 RX ORDER — SENNA PLUS 8.6 MG/1
2 TABLET ORAL AT BEDTIME
Refills: 0 | Status: DISCONTINUED | OUTPATIENT
Start: 2022-08-01 | End: 2022-08-01

## 2022-08-01 RX ORDER — SENNA PLUS 8.6 MG/1
2 TABLET ORAL AT BEDTIME
Refills: 0 | Status: DISCONTINUED | OUTPATIENT
Start: 2022-08-01 | End: 2022-08-02

## 2022-08-01 RX ORDER — CELECOXIB 200 MG/1
200 CAPSULE ORAL EVERY 12 HOURS
Refills: 0 | Status: DISCONTINUED | OUTPATIENT
Start: 2022-08-02 | End: 2022-08-02

## 2022-08-01 RX ORDER — GLUCAGON INJECTION, SOLUTION 0.5 MG/.1ML
1 INJECTION, SOLUTION SUBCUTANEOUS ONCE
Refills: 0 | Status: DISCONTINUED | OUTPATIENT
Start: 2022-08-01 | End: 2022-08-02

## 2022-08-01 RX ORDER — DEXTROSE 50 % IN WATER 50 %
15 SYRINGE (ML) INTRAVENOUS ONCE
Refills: 0 | Status: DISCONTINUED | OUTPATIENT
Start: 2022-08-01 | End: 2022-08-02

## 2022-08-01 RX ORDER — LOSARTAN POTASSIUM 100 MG/1
100 TABLET, FILM COATED ORAL DAILY
Refills: 0 | Status: DISCONTINUED | OUTPATIENT
Start: 2022-08-01 | End: 2022-08-01

## 2022-08-01 RX ORDER — DEXTROSE 50 % IN WATER 50 %
25 SYRINGE (ML) INTRAVENOUS ONCE
Refills: 0 | Status: DISCONTINUED | OUTPATIENT
Start: 2022-08-01 | End: 2022-08-01

## 2022-08-01 RX ORDER — PANTOPRAZOLE SODIUM 20 MG/1
40 TABLET, DELAYED RELEASE ORAL
Refills: 0 | Status: DISCONTINUED | OUTPATIENT
Start: 2022-08-01 | End: 2022-08-02

## 2022-08-01 RX ORDER — OXYCODONE HYDROCHLORIDE 5 MG/1
5 TABLET ORAL
Refills: 0 | Status: DISCONTINUED | OUTPATIENT
Start: 2022-08-01 | End: 2022-08-02

## 2022-08-01 RX ORDER — DEXTROSE 50 % IN WATER 50 %
15 SYRINGE (ML) INTRAVENOUS ONCE
Refills: 0 | Status: DISCONTINUED | OUTPATIENT
Start: 2022-08-01 | End: 2022-08-01

## 2022-08-01 RX ORDER — SODIUM CHLORIDE 9 MG/ML
500 INJECTION, SOLUTION INTRAVENOUS ONCE
Refills: 0 | Status: DISCONTINUED | OUTPATIENT
Start: 2022-08-01 | End: 2022-08-01

## 2022-08-01 RX ORDER — MAGNESIUM HYDROXIDE 400 MG/1
30 TABLET, CHEWABLE ORAL DAILY
Refills: 0 | Status: DISCONTINUED | OUTPATIENT
Start: 2022-08-01 | End: 2022-08-02

## 2022-08-01 RX ORDER — INSULIN LISPRO 100/ML
VIAL (ML) SUBCUTANEOUS AT BEDTIME
Refills: 0 | Status: DISCONTINUED | OUTPATIENT
Start: 2022-08-01 | End: 2022-08-02

## 2022-08-01 RX ORDER — PANTOPRAZOLE SODIUM 20 MG/1
40 TABLET, DELAYED RELEASE ORAL
Refills: 0 | Status: DISCONTINUED | OUTPATIENT
Start: 2022-08-01 | End: 2022-08-01

## 2022-08-01 RX ORDER — INSULIN LISPRO 100/ML
VIAL (ML) SUBCUTANEOUS
Refills: 0 | Status: DISCONTINUED | OUTPATIENT
Start: 2022-08-01 | End: 2022-08-01

## 2022-08-01 RX ORDER — SIMVASTATIN 20 MG/1
1 TABLET, FILM COATED ORAL
Qty: 0 | Refills: 0 | DISCHARGE

## 2022-08-01 RX ORDER — METFORMIN HYDROCHLORIDE 850 MG/1
1 TABLET ORAL
Qty: 0 | Refills: 0 | DISCHARGE

## 2022-08-01 RX ORDER — MAGNESIUM HYDROXIDE 400 MG/1
30 TABLET, CHEWABLE ORAL DAILY
Refills: 0 | Status: DISCONTINUED | OUTPATIENT
Start: 2022-08-01 | End: 2022-08-01

## 2022-08-01 RX ORDER — ACETAMINOPHEN 500 MG
1000 TABLET ORAL ONCE
Refills: 0 | Status: DISCONTINUED | OUTPATIENT
Start: 2022-08-01 | End: 2022-08-01

## 2022-08-01 RX ORDER — LANOLIN ALCOHOL/MO/W.PET/CERES
3 CREAM (GRAM) TOPICAL AT BEDTIME
Refills: 0 | Status: DISCONTINUED | OUTPATIENT
Start: 2022-08-01 | End: 2022-08-02

## 2022-08-01 RX ORDER — DEXTROSE 50 % IN WATER 50 %
25 SYRINGE (ML) INTRAVENOUS ONCE
Refills: 0 | Status: DISCONTINUED | OUTPATIENT
Start: 2022-08-01 | End: 2022-08-02

## 2022-08-01 RX ORDER — POLYETHYLENE GLYCOL 3350 17 G/17G
17 POWDER, FOR SOLUTION ORAL AT BEDTIME
Refills: 0 | Status: DISCONTINUED | OUTPATIENT
Start: 2022-08-01 | End: 2022-08-02

## 2022-08-01 RX ORDER — SIMVASTATIN 20 MG/1
40 TABLET, FILM COATED ORAL AT BEDTIME
Refills: 0 | Status: DISCONTINUED | OUTPATIENT
Start: 2022-08-01 | End: 2022-08-02

## 2022-08-01 RX ORDER — SODIUM CHLORIDE 9 MG/ML
1000 INJECTION, SOLUTION INTRAVENOUS
Refills: 0 | Status: DISCONTINUED | OUTPATIENT
Start: 2022-08-01 | End: 2022-08-02

## 2022-08-01 RX ORDER — OXYCODONE HYDROCHLORIDE 5 MG/1
5 TABLET ORAL
Refills: 0 | Status: DISCONTINUED | OUTPATIENT
Start: 2022-08-01 | End: 2022-08-01

## 2022-08-01 RX ORDER — TRAMADOL HYDROCHLORIDE 50 MG/1
50 TABLET ORAL EVERY 6 HOURS
Refills: 0 | Status: DISCONTINUED | OUTPATIENT
Start: 2022-08-01 | End: 2022-08-01

## 2022-08-01 RX ORDER — TRAMADOL HYDROCHLORIDE 50 MG/1
50 TABLET ORAL EVERY 6 HOURS
Refills: 0 | Status: DISCONTINUED | OUTPATIENT
Start: 2022-08-01 | End: 2022-08-02

## 2022-08-01 RX ORDER — HYDROMORPHONE HYDROCHLORIDE 2 MG/ML
0.25 INJECTION INTRAMUSCULAR; INTRAVENOUS; SUBCUTANEOUS
Refills: 0 | Status: DISCONTINUED | OUTPATIENT
Start: 2022-08-01 | End: 2022-08-02

## 2022-08-01 RX ORDER — BENZOCAINE AND MENTHOL 5; 1 G/100ML; G/100ML
1 LIQUID ORAL AT BEDTIME
Refills: 0 | Status: DISCONTINUED | OUTPATIENT
Start: 2022-08-01 | End: 2022-08-02

## 2022-08-01 RX ORDER — ONDANSETRON 8 MG/1
4 TABLET, FILM COATED ORAL EVERY 6 HOURS
Refills: 0 | Status: DISCONTINUED | OUTPATIENT
Start: 2022-08-01 | End: 2022-08-01

## 2022-08-01 RX ORDER — GLUCAGON INJECTION, SOLUTION 0.5 MG/.1ML
1 INJECTION, SOLUTION SUBCUTANEOUS ONCE
Refills: 0 | Status: DISCONTINUED | OUTPATIENT
Start: 2022-08-01 | End: 2022-08-01

## 2022-08-01 RX ORDER — ACETAMINOPHEN 500 MG
975 TABLET ORAL EVERY 8 HOURS
Refills: 0 | Status: DISCONTINUED | OUTPATIENT
Start: 2022-08-02 | End: 2022-08-02

## 2022-08-01 RX ORDER — LOSARTAN POTASSIUM 100 MG/1
100 TABLET, FILM COATED ORAL DAILY
Refills: 0 | Status: DISCONTINUED | OUTPATIENT
Start: 2022-08-01 | End: 2022-08-02

## 2022-08-01 RX ORDER — ONDANSETRON 8 MG/1
4 TABLET, FILM COATED ORAL EVERY 6 HOURS
Refills: 0 | Status: DISCONTINUED | OUTPATIENT
Start: 2022-08-01 | End: 2022-08-02

## 2022-08-01 RX ORDER — SIMVASTATIN 20 MG/1
40 TABLET, FILM COATED ORAL AT BEDTIME
Refills: 0 | Status: DISCONTINUED | OUTPATIENT
Start: 2022-08-01 | End: 2022-08-01

## 2022-08-01 RX ORDER — CELECOXIB 200 MG/1
200 CAPSULE ORAL EVERY 12 HOURS
Refills: 0 | Status: CANCELLED | OUTPATIENT
Start: 2022-08-02 | End: 2022-08-01

## 2022-08-01 RX ORDER — SODIUM CHLORIDE 9 MG/ML
500 INJECTION, SOLUTION INTRAVENOUS ONCE
Refills: 0 | Status: COMPLETED | OUTPATIENT
Start: 2022-08-01 | End: 2022-08-02

## 2022-08-01 RX ORDER — ASPIRIN/CALCIUM CARB/MAGNESIUM 324 MG
325 TABLET ORAL
Refills: 0 | Status: DISCONTINUED | OUTPATIENT
Start: 2022-08-01 | End: 2022-08-01

## 2022-08-01 RX ORDER — DEXTROSE 50 % IN WATER 50 %
12.5 SYRINGE (ML) INTRAVENOUS ONCE
Refills: 0 | Status: DISCONTINUED | OUTPATIENT
Start: 2022-08-01 | End: 2022-08-01

## 2022-08-01 RX ORDER — SODIUM CHLORIDE 9 MG/ML
3 INJECTION INTRAMUSCULAR; INTRAVENOUS; SUBCUTANEOUS EVERY 8 HOURS
Refills: 0 | Status: DISCONTINUED | OUTPATIENT
Start: 2022-08-01 | End: 2022-08-01

## 2022-08-01 RX ORDER — ACETAMINOPHEN 500 MG
1000 TABLET ORAL ONCE
Refills: 0 | Status: COMPLETED | OUTPATIENT
Start: 2022-08-02 | End: 2022-08-02

## 2022-08-01 RX ORDER — BENZOCAINE AND MENTHOL 5; 1 G/100ML; G/100ML
1 LIQUID ORAL AT BEDTIME
Refills: 0 | Status: DISCONTINUED | OUTPATIENT
Start: 2022-08-01 | End: 2022-08-01

## 2022-08-01 RX ORDER — DEXTROSE 50 % IN WATER 50 %
12.5 SYRINGE (ML) INTRAVENOUS ONCE
Refills: 0 | Status: DISCONTINUED | OUTPATIENT
Start: 2022-08-01 | End: 2022-08-02

## 2022-08-01 RX ORDER — POLYETHYLENE GLYCOL 3350 17 G/17G
17 POWDER, FOR SOLUTION ORAL AT BEDTIME
Refills: 0 | Status: DISCONTINUED | OUTPATIENT
Start: 2022-08-01 | End: 2022-08-01

## 2022-08-01 RX ORDER — CEFAZOLIN SODIUM 1 G
2000 VIAL (EA) INJECTION EVERY 8 HOURS
Refills: 0 | Status: COMPLETED | OUTPATIENT
Start: 2022-08-01 | End: 2022-08-02

## 2022-08-01 RX ORDER — LOSARTAN POTASSIUM 100 MG/1
1 TABLET, FILM COATED ORAL
Qty: 0 | Refills: 0 | DISCHARGE

## 2022-08-01 RX ADMIN — SODIUM CHLORIDE 500 MILLILITER(S): 9 INJECTION, SOLUTION INTRAVENOUS at 12:56

## 2022-08-01 RX ADMIN — Medication 2: at 17:10

## 2022-08-01 RX ADMIN — TRAMADOL HYDROCHLORIDE 50 MILLIGRAM(S): 50 TABLET ORAL at 09:46

## 2022-08-01 RX ADMIN — HYDROMORPHONE HYDROCHLORIDE 0.25 MILLIGRAM(S): 2 INJECTION INTRAMUSCULAR; INTRAVENOUS; SUBCUTANEOUS at 13:18

## 2022-08-01 RX ADMIN — Medication 1000 MILLIGRAM(S): at 17:52

## 2022-08-01 RX ADMIN — Medication 1: at 21:21

## 2022-08-01 RX ADMIN — Medication 100 MILLIGRAM(S): at 17:37

## 2022-08-01 RX ADMIN — SODIUM CHLORIDE 500 MILLILITER(S): 9 INJECTION, SOLUTION INTRAVENOUS at 23:50

## 2022-08-01 RX ADMIN — Medication 400 MILLIGRAM(S): at 17:37

## 2022-08-01 RX ADMIN — SENNA PLUS 2 TABLET(S): 8.6 TABLET ORAL at 21:12

## 2022-08-01 RX ADMIN — BENZOCAINE AND MENTHOL 1 LOZENGE: 5; 1 LIQUID ORAL at 21:11

## 2022-08-01 RX ADMIN — SIMVASTATIN 40 MILLIGRAM(S): 20 TABLET, FILM COATED ORAL at 21:12

## 2022-08-01 RX ADMIN — Medication 325 MILLIGRAM(S): at 17:13

## 2022-08-01 RX ADMIN — Medication 15 MILLIGRAM(S): at 17:28

## 2022-08-01 RX ADMIN — Medication 15 MILLIGRAM(S): at 17:13

## 2022-08-01 RX ADMIN — PANTOPRAZOLE SODIUM 40 MILLIGRAM(S): 20 TABLET, DELAYED RELEASE ORAL at 09:46

## 2022-08-01 RX ADMIN — Medication 3 MILLIGRAM(S): at 21:12

## 2022-08-01 RX ADMIN — HYDROMORPHONE HYDROCHLORIDE 0.25 MILLIGRAM(S): 2 INJECTION INTRAMUSCULAR; INTRAVENOUS; SUBCUTANEOUS at 13:03

## 2022-08-01 NOTE — PRE-ANESTHESIA EVALUATION ADULT - NSANTHPMHFT_GEN_ALL_CORE
chart and consultant notes reviewed. et ~1 flight, no cp/sob. ekg noted. no hx sig cv/pulm dz. dm fs < 200, a1c ~7. ckd

## 2022-08-01 NOTE — BRIEF OPERATIVE NOTE - NSICDXBRIEFPREOP_GEN_ALL_CORE_FT
PRE-OP DIAGNOSIS:  Unilateral primary osteoarthritis, left knee 01-Aug-2022 12:22:04  Pasquale Godoy

## 2022-08-01 NOTE — PRE-ANESTHESIA EVALUATION ADULT - NSANTHADDINFOFT_GEN_ALL_CORE
extensive rba dw pt at [facility], agrees with plan. nerve block for post op pain specifically declined by surgeon

## 2022-08-01 NOTE — OCCUPATIONAL THERAPY INITIAL EVALUATION ADULT - LIVES WITH, PROFILE
Pt lives in apartment with her daughter. 7 steps to enter +railing. +tub transfer +grab bars +shower chair. Pt ambulated with cane in the home and rollator outside. Pt reports her dtr assist her with tub transfer PTA./children

## 2022-08-01 NOTE — PHYSICAL THERAPY INITIAL EVALUATION ADULT - PERTINENT HX OF CURRENT PROBLEM, REHAB EVAL
Hx B/L Knee OA s/p Right TKR with [employer] (4/19/21) reports c/o intermittent left "piercing" knee pain that is worse with weight-bearing/walking. She uses a cane to assist with walking and rollator for distances outside of the home. X-rays of the left knee obtained on 5/23/22 demonstrated advanced degenerative bone-on-bone arthritis. Pt now s/p scheduled Left Total Knee Replacement with [employer] POD0 with Dr. Raza.

## 2022-08-01 NOTE — DISCHARGE NOTE PROVIDER - NSDCMRMEDTOKEN_GEN_ALL_CORE_FT
acetaminophen 325 mg oral tablet: 3 tab(s) orally every 8 hours standing around the clock until follow up with Ortho Surgeon MDD:TWELVE TABS  Aspirin Low Dose 81 mg oral delayed release tablet: 1 tab(s) orally once a day  losartan 100 mg oral tablet: 1 tab(s) orally once a day  metFORMIN 500 mg oral tablet: 1 tab(s) orally 2 times a day  simvastatin 40 mg oral tablet: 1 tab(s) orally once a day (at bedtime)   aspirin 325 mg oral tablet: 1 tab(s) orally 2 times a day for blood clot prevention x 6 weeks  Colace 100 mg oral capsule: 1 cap(s) orally 3 times a day   losartan 100 mg oral tablet: 1 tab(s) orally once a day  metFORMIN 500 mg oral tablet: 1 tab(s) orally 2 times a day  Multiple Vitamins oral tablet: 1 tab(s) orally once a day  naproxen 500 mg oral tablet: 1 tab(s) orally 2 times a day   oxyCODONE 5 mg oral tablet: 1 tab(s) orally every 4 hours, As Needed -for Severe Pain (7 - 10) MDD:006  pantoprazole 40 mg oral delayed release tablet: 1 tab(s) orally once a day (before a meal)  rolling walker: s/p left total knee replacement (72439)  SUZI = 99m  M17.12 Left knee OA.   senna leaf extract oral tablet: 2 tab(s) orally once a day (at bedtime)  simvastatin 40 mg oral tablet: 1 tab(s) orally once a day (at bedtime)  traMADol 50 mg oral tablet: 1 tab(s) orally every 8 hours, As Needed -Mild Pain (1 - 3) MDD:003  Tylenol Extra Strength 500 mg oral tablet: 2 tab(s) orally every 8 hours

## 2022-08-01 NOTE — DISCHARGE NOTE PROVIDER - CARE PROVIDER_API CALL
Aminata Raza)  Orthopaedic Surgery  [address], [address]  [place] [address]  Phone: [phone]  Fax: [phone]  Established Patient  Follow Up Time: 2 weeks

## 2022-08-01 NOTE — PATIENT PROFILE ADULT - SAFE PLACE TO LIVE
Fabrizio Wylie M.D.  Family Medicine [phone]    [facility]   [phone]        Anticoagulation Patient Findings      INR  sub-therapeutic.   Denies signs/symptoms of bleeding and/or thrombosis.   Denies changes to diet or medications.   Follow up appointment in 1 week(s) per pt     Increase weekly warfarin dose as noted      Adrian Tran, PharmD       no

## 2022-08-01 NOTE — DISCHARGE NOTE PROVIDER - HOSPITAL COURSE
History of Present Illness:   72 year old female with HTN, HLD, T2DM (HGA1C 6.6% 2/2022 as per pt) and B/L Knee OA s/p Right TKR with [employer] (4/19/21) reports c/o intermittent left "piercing" knee pain that is worse with weight-bearing/walking. At rest, she rates her pain 2/10 and 10/10 with activity. She uses a cane to assist with walking and rollator for distances outside of the home. She has tried cortisone injections (last one 7/2021) and physical therapy with minimal relief of pain. X-rays of the left knee obtained on 5/23/22 demonstrated advanced degenerative bone-on-bone arthritis. Pt now presents to PST for scheduled Left Total Knee Replacement with [employer] on 7/16/22 with Dr. Raza.    Covid PCR test scheduled for 7/13/22 at [facility]; Pt advised to contact surgeon's office to schedule Covid PCR test closer to home location  Fully vaccinated with Covid Series  No recent hospitalizations over the last 3 months  Denies recent travel, exposure or Covid symptoms  No Covid + infections in the past     Goals of Care: "I am having a left total knee replacement"    PAST MEDICAL HISTORY:  Borderline glaucoma B/L - Latanoprost eye gtts daily  H/O Graves' disease 10 years ago - Resolved  HLD (hyperlipidemia) on Simvastatin  HTN (hypertension) on Losartan  Type 2 diabetes mellitus on Metformin; Pt reports Most recent HGA1C 6.6% from 2/2022  Unilateral primary osteoarthritis, left knee Pending Left Total Knee Replacement with [employer] on 7/16/22 with Dr. Raza  Unilateral primary osteoarthritis, right knee s/p Total R Knee Replacement (4/19/21).     PAST SURGICAL HISTORY:  S/P total knee replacement, right s/p Total R Knee Replacement with [employer] (4/19/21).     Hospital Course:   After admission on 8/1/2022 and receiving pre-operative parenteral prophylactic antibiotics, the patient underwent an uncomplicated Left total knee replacement with [employer] robotic assist with Dr. Raza. Patient tolerated the procedure well, was transferred to the recovery room in stable condition, with a stable neuro / vascular exam of the operated extremity.    Typical Physical & occupational therapy modalities post surgery were performed by physical and occupational therapies, including ambulation training, range of motion, ADL's, abd transfers.  After progression of mobility guided by the PT/ OT staff,  the patient was felt to benefit from further rehabilitative care for restoration to level of function. This was felt to best be accomplished at home with home PT/OT.  Discharge and Orthopedic Care instructions were delineated in the Discharge Plan and reviewed with the patient. All medications were delineated in the medication reconciliation tool and key points were reviewed with the patient. They were deemed stable from an Orthopedic & medical standpoint for discharge once cleared by physical therapy. History of Present Illness:   72 year old female with HTN, HLD, T2DM (HGA1C 6.6% 2/2022 as per pt) and B/L Knee OA s/p Right TKR with [employer] (4/19/21) reports c/o intermittent left "piercing" knee pain that is worse with weight-bearing/walking. At rest, she rates her pain 2/10 and 10/10 with activity. She uses a cane to assist with walking and rollator for distances outside of the home. She has tried cortisone injections (last one 7/2021) and physical therapy with minimal relief of pain. X-rays of the left knee obtained on 5/23/22 demonstrated advanced degenerative bone-on-bone arthritis. Pt now presents to PST for scheduled Left Total Knee Replacement with [employer] on 7/16/22 with Dr. Raza.    Covid PCR test scheduled for 7/13/22 at [facility]; Pt advised to contact surgeon's office to schedule Covid PCR test closer to home location  Fully vaccinated with Covid Series  No recent hospitalizations over the last 3 months  Denies recent travel, exposure or Covid symptoms  No Covid + infections in the past     Goals of Care: "I am hoping to walk on my own again as I used to before"    PAST MEDICAL HISTORY:  Borderline glaucoma B/L - Latanoprost eye gtts daily  H/O Graves' disease 10 years ago - Resolved  HLD (hyperlipidemia) on Simvastatin  HTN (hypertension) on Losartan  Type 2 diabetes mellitus on Metformin; Pt reports Most recent HGA1C 6.6% from 2/2022  Unilateral primary osteoarthritis, left knee Pending Left Total Knee Replacement with [employer] on 7/16/22 with Dr. Raza  Unilateral primary osteoarthritis, right knee s/p Total R Knee Replacement (4/19/21).     PAST SURGICAL HISTORY:  S/P total knee replacement, right s/p Total R Knee Replacement with [employer] (4/19/21).     Hospital Course:   After admission on 8/1/2022 and receiving pre-operative parenteral prophylactic antibiotics, the patient underwent an uncomplicated Left total knee replacement with [employer] robotic assist with Dr. Raza. Patient tolerated the procedure well, was transferred to the recovery room in stable condition, with a stable neuro / vascular exam of the operated extremity.    Typical Physical & occupational therapy modalities post surgery were performed by physical and occupational therapies, including ambulation training, range of motion, ADL's, abd transfers.  After progression of mobility guided by the PT/ OT staff,  the patient was felt to benefit from further rehabilitative care for restoration to level of function. This was felt to best be accomplished at home with home PT/OT.  Discharge and Orthopedic Care instructions were delineated in the Discharge Plan and reviewed with the patient. All medications were delineated in the medication reconciliation tool and key points were reviewed with the patient. They were deemed stable from an Orthopedic & medical standpoint for discharge once cleared by physical therapy.

## 2022-08-01 NOTE — PHYSICAL THERAPY INITIAL EVALUATION ADULT - ADDITIONAL COMMENTS
Pt lives in home with 6 steps to enter and with daughter to assist. Patient was independent with all ADLs and IADLs prior to admission. Using a standard cane prior to admission

## 2022-08-01 NOTE — PHYSICAL THERAPY INITIAL EVALUATION ADULT - ACTIVE RANGE OF MOTION EXAMINATION, REHAB EVAL
L knee flexion: 0-75/bilateral upper extremity Active ROM was WFL (within functional limits)/bilateral  lower extremity Active ROM was WFL (within functional limits)

## 2022-08-01 NOTE — BRIEF OPERATIVE NOTE - NSICDXBRIEFPOSTOP_GEN_ALL_CORE_FT
POST-OP DIAGNOSIS:  Unilateral primary osteoarthritis, left knee 01-Aug-2022 12:22:07  Pasquale Godoy

## 2022-08-01 NOTE — OCCUPATIONAL THERAPY INITIAL EVALUATION ADULT - PERTINENT HX OF CURRENT PROBLEM, REHAB EVAL
72F Hx B/L Knee OA s/p Right TKR with [employer] (4/19/21) reports c/o intermittent left "piercing" knee pain that is worse with weight-bearing/walking. She uses a cane to assist with walking and rollator for distances outside of the home. X-rays of the left knee obtained on 5/23/22 demonstrated advanced degenerative bone-on-bone arthritis. Pt now s/p scheduled Left Total Knee Replacement with [employer] POD0 with Dr. Raza.

## 2022-08-01 NOTE — DISCHARGE NOTE PROVIDER - NSDCCPTREATMENT_GEN_ALL_CORE_FT
PRINCIPAL PROCEDURE  Procedure: Total knee replacement  Findings and Treatment: Left total knee - [employer] robotic assist

## 2022-08-01 NOTE — CHART NOTE - NSCHARTNOTEFT_GEN_A_CORE
Resting without complaints.  No Chest Pain, SOB, N/V.    T(C): 36 (08-01-22 @ 12:20), Max: 36.5 (08-01-22 @ 08:29)  HR: 69 (08-01-22 @ 13:00) (59 - 79)  BP: 145/76 (08-01-22 @ 13:00) (134/75 - 154/81)  RR: 18 (08-01-22 @ 13:00) (16 - 18)  SpO2: 99% (08-01-22 @ 13:00) (97% - 99%)      Exam:  Alert and [place], No Acute Distress  Card: +S1/S2, RRR  Pulm: CTAB  Laterality: L Knee  Calves soft, non-tender bilaterally  +PF/DF/EHL/FHL  SILT  + DP pulse    Xray: S/P L Total Knee Arthroplasty, prosthesis in place and intact with no signs of walter-prosthetic Fx.             A/P: 72y Female S/p L Total Knee Arthroplasty. VSS. NAD  -PT/OT-WBAT LLE  -F/U AM Labs tomorrow  -IS  -DVT PPx: ASA 325mg PO BID and SCDs  -Pain Control  -Continue Current Tx  -Dispo planning PACU to Floor    Zackary Hebert PA-C  Orthopedic Surgery Team  Team Pager #[phone]

## 2022-08-01 NOTE — DISCHARGE NOTE PROVIDER - NSDCFUADDINST_GEN_ALL_CORE_FT
Please call to schedule your post-operative follow up appointment with Dr. Raza for 2 weeks after hospital discharge.   Keep dressing clean, dry, and intact. Have doctor remove bandage at your post-operative follow up appointment.   Continue to ambulate as tolerated to the left leg with a rolling walker.   Continue to take Aspirin 325 mg twice daily x 6 weeks to help prevent blood clots.   Recommended to follow up with your primary care provider within 1-2 months of hospital discharge to discuss your recent surgery and any change to your medications.   Shower: with assistance. keep the dressing away from the stream of water. Pat the dressing dry when coming out of the shower. no tub baths.

## 2022-08-02 ENCOUNTER — TRANSCRIPTION ENCOUNTER (OUTPATIENT)
Age: 72
End: 2022-08-02

## 2022-08-02 VITALS
SYSTOLIC BLOOD PRESSURE: 113 MMHG | TEMPERATURE: 97 F | RESPIRATION RATE: 18 BRPM | HEART RATE: 81 BPM | OXYGEN SATURATION: 97 % | DIASTOLIC BLOOD PRESSURE: 54 MMHG

## 2022-08-02 LAB
ANION GAP SERPL CALC-SCNC: 10 MMOL/L — SIGNIFICANT CHANGE UP (ref 5–17)
BUN SERPL-MCNC: 24 MG/DL — HIGH (ref 7–23)
CALCIUM SERPL-MCNC: 8.6 MG/DL — SIGNIFICANT CHANGE UP (ref 8.4–10.5)
CHLORIDE SERPL-SCNC: 105 MMOL/L — SIGNIFICANT CHANGE UP (ref 96–108)
CO2 SERPL-SCNC: 20 MMOL/L — LOW (ref 22–31)
CREAT SERPL-MCNC: 1.08 MG/DL — SIGNIFICANT CHANGE UP (ref 0.5–1.3)
EGFR: 54 ML/MIN/1.73M2 — LOW
GLUCOSE BLDC GLUCOMTR-MCNC: 194 MG/DL — HIGH (ref 70–99)
GLUCOSE SERPL-MCNC: 244 MG/DL — HIGH (ref 70–99)
HCT VFR BLD CALC: 32.1 % — LOW (ref 34.5–45)
HGB BLD-MCNC: 10.5 G/DL — LOW (ref 11.5–15.5)
MCHC RBC-ENTMCNC: 29.6 PG — SIGNIFICANT CHANGE UP (ref 27–34)
MCHC RBC-ENTMCNC: 32.7 GM/DL — SIGNIFICANT CHANGE UP (ref 32–36)
MCV RBC AUTO: 90.4 FL — SIGNIFICANT CHANGE UP (ref 80–100)
NRBC # BLD: 0 /100 WBCS — SIGNIFICANT CHANGE UP (ref 0–0)
PLATELET # BLD AUTO: 248 K/UL — SIGNIFICANT CHANGE UP (ref 150–400)
POTASSIUM SERPL-MCNC: 4.8 MMOL/L — SIGNIFICANT CHANGE UP (ref 3.5–5.3)
POTASSIUM SERPL-SCNC: 4.8 MMOL/L — SIGNIFICANT CHANGE UP (ref 3.5–5.3)
RBC # BLD: 3.55 M/UL — LOW (ref 3.8–5.2)
RBC # FLD: 13.5 % — SIGNIFICANT CHANGE UP (ref 10.3–14.5)
SODIUM SERPL-SCNC: 135 MMOL/L — SIGNIFICANT CHANGE UP (ref 135–145)
WBC # BLD: 9.21 K/UL — SIGNIFICANT CHANGE UP (ref 3.8–10.5)
WBC # FLD AUTO: 9.21 K/UL — SIGNIFICANT CHANGE UP (ref 3.8–10.5)

## 2022-08-02 PROCEDURE — C1776: CPT

## 2022-08-02 PROCEDURE — 85027 COMPLETE CBC AUTOMATED: CPT

## 2022-08-02 PROCEDURE — 97530 THERAPEUTIC ACTIVITIES: CPT

## 2022-08-02 PROCEDURE — S2900: CPT

## 2022-08-02 PROCEDURE — 82962 GLUCOSE BLOOD TEST: CPT

## 2022-08-02 PROCEDURE — 97161 PT EVAL LOW COMPLEX 20 MIN: CPT

## 2022-08-02 PROCEDURE — 80048 BASIC METABOLIC PNL TOTAL CA: CPT

## 2022-08-02 PROCEDURE — C1713: CPT

## 2022-08-02 PROCEDURE — 73560 X-RAY EXAM OF KNEE 1 OR 2: CPT

## 2022-08-02 PROCEDURE — 97116 GAIT TRAINING THERAPY: CPT

## 2022-08-02 PROCEDURE — 97165 OT EVAL LOW COMPLEX 30 MIN: CPT

## 2022-08-02 PROCEDURE — 36415 COLL VENOUS BLD VENIPUNCTURE: CPT

## 2022-08-02 RX ORDER — ASPIRIN/CALCIUM CARB/MAGNESIUM 324 MG
1 TABLET ORAL
Qty: 0 | Refills: 0 | DISCHARGE

## 2022-08-02 RX ORDER — OXYCODONE HYDROCHLORIDE 5 MG/1
1 TABLET ORAL
Qty: 30 | Refills: 0
Start: 2022-08-02 | End: 2022-08-06

## 2022-08-02 RX ORDER — ASPIRIN/CALCIUM CARB/MAGNESIUM 324 MG
1 TABLET ORAL
Qty: 84 | Refills: 0
Start: 2022-08-02 | End: 2022-09-12

## 2022-08-02 RX ORDER — PANTOPRAZOLE SODIUM 20 MG/1
1 TABLET, DELAYED RELEASE ORAL
Qty: 42 | Refills: 0
Start: 2022-08-02 | End: 2022-09-12

## 2022-08-02 RX ORDER — TRAMADOL HYDROCHLORIDE 50 MG/1
1 TABLET ORAL
Qty: 21 | Refills: 0
Start: 2022-08-02 | End: 2022-08-08

## 2022-08-02 RX ORDER — DOCUSATE SODIUM 100 MG
1 CAPSULE ORAL
Qty: 21 | Refills: 0
Start: 2022-08-02 | End: 2022-08-08

## 2022-08-02 RX ORDER — SENNA PLUS 8.6 MG/1
2 TABLET ORAL
Qty: 28 | Refills: 0
Start: 2022-08-02 | End: 2022-08-15

## 2022-08-02 RX ORDER — ACETAMINOPHEN 500 MG
2 TABLET ORAL
Qty: 84 | Refills: 0
Start: 2022-08-02 | End: 2022-08-15

## 2022-08-02 RX ADMIN — Medication 15 MILLIGRAM(S): at 00:32

## 2022-08-02 RX ADMIN — Medication 325 MILLIGRAM(S): at 06:40

## 2022-08-02 RX ADMIN — Medication 15 MILLIGRAM(S): at 11:35

## 2022-08-02 RX ADMIN — Medication 15 MILLIGRAM(S): at 06:40

## 2022-08-02 RX ADMIN — LOSARTAN POTASSIUM 100 MILLIGRAM(S): 100 TABLET, FILM COATED ORAL at 09:54

## 2022-08-02 RX ADMIN — Medication 400 MILLIGRAM(S): at 10:29

## 2022-08-02 RX ADMIN — OXYCODONE HYDROCHLORIDE 10 MILLIGRAM(S): 5 TABLET ORAL at 04:00

## 2022-08-02 RX ADMIN — PANTOPRAZOLE SODIUM 40 MILLIGRAM(S): 20 TABLET, DELAYED RELEASE ORAL at 06:40

## 2022-08-02 RX ADMIN — Medication 100 MILLIGRAM(S): at 02:07

## 2022-08-02 RX ADMIN — SODIUM CHLORIDE 500 MILLILITER(S): 9 INJECTION, SOLUTION INTRAVENOUS at 06:56

## 2022-08-02 RX ADMIN — Medication 15 MILLIGRAM(S): at 00:00

## 2022-08-02 RX ADMIN — OXYCODONE HYDROCHLORIDE 10 MILLIGRAM(S): 5 TABLET ORAL at 04:45

## 2022-08-02 RX ADMIN — Medication 400 MILLIGRAM(S): at 02:06

## 2022-08-02 RX ADMIN — Medication 1000 MILLIGRAM(S): at 02:30

## 2022-08-02 RX ADMIN — Medication 1: at 07:29

## 2022-08-02 RX ADMIN — Medication 15 MILLIGRAM(S): at 06:55

## 2022-08-02 RX ADMIN — Medication 1000 MILLIGRAM(S): at 11:35

## 2022-08-02 RX ADMIN — Medication 15 MILLIGRAM(S): at 11:08

## 2022-08-02 NOTE — PROGRESS NOTE ADULT - SUBJECTIVE AND OBJECTIVE BOX
ORTHO PROGRESS NOTE     Patient is status post left total knee replacement with Dr. Raza, POD #1  Pt seen and examined at bedside, denies SOB, CP, Dizziness. N/V/D/HA.    No significant overnight events. Pain well controlled. Patient ambulated with PT     Vital Signs Last 24 Hrs  T(C): 36.1 (02 Aug 2022 03:30), Max: 36.5 (01 Aug 2022 08:29)  T(F): 97 (02 Aug 2022 03:30), Max: 97.7 (01 Aug 2022 08:29)  HR: 90 (02 Aug 2022 03:30) (59 - 95)  BP: 145/66 (02 Aug 2022 03:30) (118/64 - 157/77)  BP(mean): 95 (02 Aug 2022 03:30) (83 - 111)  RR: 17 (02 Aug 2022 03:30) (16 - 18)  SpO2: 99% (02 Aug 2022 03:30) (94% - 100%)    Parameters below as of 02 Aug 2022 03:30  Patient On (Oxygen Delivery Method): room air      Gen: No apparent distress  Left LE:    Dressing C/D/I  BLE: motor intact EHL/FHL/TA/GS .    Sensation is grossly intact to light touch in the distal extremities.    Compartments are soft bilaterally.  Extremities are warm .  DP 2+ BLE     Labs:  CBC Full  -  ( 02 Aug 2022 03:23 )  WBC Count : 9.21 K/uL  RBC Count : 3.55 M/uL  Hemoglobin : 10.5 g/dL  Hematocrit : 32.1 %  Platelet Count - Automated : 248 K/uL  Mean Cell Volume : 90.4 fl  Mean Cell Hemoglobin : 29.6 pg  Mean Cell Hemoglobin Concentration : 32.7 gm/dL      08-02    135  |  105  |  24<H>  ----------------------------<  244<H>  4.8   |  20<L>  |  1.08    Ca    8.6      02 Aug 2022 03:23           A/P  Pt is a 72F s/p Left Knee Arthroplasty, POD #1    - Pain control/ Analgesia  - DVT ppx  BID, foot pumps  - PT/OT - wbat/oob    - Incentive Spirometer encouraged  - GI protection - protonix  - notify ortho with questions  - Dispo: Pending PT/OT clearance, likely home today with home care.    Angela Tran PA-C  Team Pager #[phone]

## 2022-08-02 NOTE — DISCHARGE NOTE NURSING/CASE MANAGEMENT/SOCIAL WORK - PATIENT PORTAL LINK FT
You can access the FollowMyHealth Patient Portal offered by [facility] by registering at the following website: [email or web address]. By joining [facility]’s FollowMyHealth portal, you will also be able to view your health information using other applications (apps) compatible with our system.

## 2022-08-02 NOTE — DISCHARGE NOTE NURSING/CASE MANAGEMENT/SOCIAL WORK - NSDCPEFALRISK_GEN_ALL_CORE
For information on Fall & Injury Prevention, visit: [email or web address] OR  [email or web address] OR  https://www.cdc.gov/steadi/patient.html

## 2022-08-05 PROBLEM — M17.12 UNILATERAL PRIMARY OSTEOARTHRITIS, LEFT KNEE: Chronic | Status: ACTIVE | Noted: 2022-06-27

## 2022-08-05 PROBLEM — I10 ESSENTIAL (PRIMARY) HYPERTENSION: Chronic | Status: ACTIVE | Noted: 2021-03-30

## 2022-08-05 PROBLEM — E78.5 HYPERLIPIDEMIA, UNSPECIFIED: Chronic | Status: ACTIVE | Noted: 2021-03-30

## 2022-08-05 PROBLEM — M17.11 UNILATERAL PRIMARY OSTEOARTHRITIS, RIGHT KNEE: Chronic | Status: ACTIVE | Noted: 2021-03-30

## 2022-08-05 PROBLEM — H40.009 PREGLAUCOMA, UNSPECIFIED, UNSPECIFIED EYE: Chronic | Status: ACTIVE | Noted: 2022-06-27

## 2022-08-05 PROBLEM — Z86.39 PERSONAL HISTORY OF OTHER ENDOCRINE, NUTRITIONAL AND METABOLIC DISEASE: Chronic | Status: ACTIVE | Noted: 2021-03-30

## 2022-08-05 PROBLEM — E11.9 TYPE 2 DIABETES MELLITUS WITHOUT COMPLICATIONS: Chronic | Status: ACTIVE | Noted: 2021-03-30

## 2022-08-18 ENCOUNTER — APPOINTMENT (OUTPATIENT)
Dept: ORTHOPEDIC SURGERY | Facility: CLINIC | Age: 72
End: 2022-08-18

## 2022-08-18 VITALS — BODY MASS INDEX: 31.83 KG/M2 | WEIGHT: 210 LBS | HEIGHT: 68 IN

## 2022-08-18 PROCEDURE — 73562 X-RAY EXAM OF KNEE 3: CPT | Mod: LT

## 2022-08-18 PROCEDURE — 99024 POSTOP FOLLOW-UP VISIT: CPT

## 2022-09-14 RX ORDER — AMOXICILLIN AND CLAVULANATE POTASSIUM 500; 125 MG/1; MG/1
500-125 TABLET, FILM COATED ORAL
Qty: 14 | Refills: 0 | Status: ACTIVE | COMMUNITY
Start: 2022-09-14 | End: 1900-01-01

## 2022-12-09 ENCOUNTER — APPOINTMENT (OUTPATIENT)
Dept: ORTHOPEDIC SURGERY | Facility: CLINIC | Age: 72
End: 2022-12-09

## 2022-12-09 VITALS
DIASTOLIC BLOOD PRESSURE: 92 MMHG | BODY MASS INDEX: 31.83 KG/M2 | SYSTOLIC BLOOD PRESSURE: 177 MMHG | TEMPERATURE: 97.3 F | HEART RATE: 64 BPM | WEIGHT: 210 LBS | HEIGHT: 68 IN

## 2022-12-09 PROCEDURE — 99214 OFFICE O/P EST MOD 30 MIN: CPT

## 2022-12-09 PROCEDURE — 73562 X-RAY EXAM OF KNEE 3: CPT | Mod: RT

## 2022-12-09 RX ORDER — DICLOFENAC SODIUM 50 MG/1
50 TABLET, DELAYED RELEASE ORAL
Qty: 28 | Refills: 0 | Status: ACTIVE | COMMUNITY
Start: 2022-12-09 | End: 1900-01-01

## 2023-03-16 ENCOUNTER — APPOINTMENT (OUTPATIENT)
Dept: ORTHOPEDIC SURGERY | Facility: CLINIC | Age: 73
End: 2023-03-16

## 2023-04-14 ENCOUNTER — APPOINTMENT (OUTPATIENT)
Dept: ORTHOPEDIC SURGERY | Facility: CLINIC | Age: 73
End: 2023-04-14
Payer: MEDICARE

## 2023-04-14 VITALS
HEART RATE: 94 BPM | OXYGEN SATURATION: 94 % | WEIGHT: 206 LBS | DIASTOLIC BLOOD PRESSURE: 79 MMHG | BODY MASS INDEX: 31.22 KG/M2 | HEIGHT: 68 IN | TEMPERATURE: 97.3 F | SYSTOLIC BLOOD PRESSURE: 145 MMHG

## 2023-04-14 DIAGNOSIS — M17.12 UNILATERAL PRIMARY OSTEOARTHRITIS, LEFT KNEE: ICD-10-CM

## 2023-04-14 DIAGNOSIS — M17.11 UNILATERAL PRIMARY OSTEOARTHRITIS, RIGHT KNEE: ICD-10-CM

## 2023-04-14 PROCEDURE — 99214 OFFICE O/P EST MOD 30 MIN: CPT

## 2023-04-14 PROCEDURE — 73562 X-RAY EXAM OF KNEE 3: CPT | Mod: RT

## 2023-04-14 RX ORDER — DICLOFENAC SODIUM 50 MG/1
50 TABLET, DELAYED RELEASE ORAL TWICE DAILY
Qty: 28 | Refills: 0 | Status: ACTIVE | COMMUNITY
Start: 2023-04-14 | End: 1900-01-01

## 2023-04-20 PROBLEM — M17.12 PRIMARY LOCALIZED OSTEOARTHRITIS OF LEFT KNEE: Status: ACTIVE | Noted: 2021-08-05

## 2023-04-20 PROBLEM — M17.11 PRIMARY LOCALIZED OSTEOARTHRITIS OF RIGHT KNEE: Status: ACTIVE | Noted: 2020-03-12

## 2023-10-19 ENCOUNTER — APPOINTMENT (OUTPATIENT)
Dept: ORTHOPEDIC SURGERY | Facility: CLINIC | Age: 73
End: 2023-10-19
Payer: MEDICARE

## 2023-10-19 VITALS — BODY MASS INDEX: 31.07 KG/M2 | WEIGHT: 205 LBS | HEIGHT: 68 IN

## 2023-10-19 DIAGNOSIS — M70.62 TROCHANTERIC BURSITIS, RIGHT HIP: ICD-10-CM

## 2023-10-19 DIAGNOSIS — M70.61 TROCHANTERIC BURSITIS, RIGHT HIP: ICD-10-CM

## 2023-10-19 PROCEDURE — 99214 OFFICE O/P EST MOD 30 MIN: CPT | Mod: 25

## 2023-10-19 PROCEDURE — 20610 DRAIN/INJ JOINT/BURSA W/O US: CPT | Mod: RT

## 2023-10-19 PROCEDURE — 73521 X-RAY EXAM HIPS BI 2 VIEWS: CPT

## 2023-10-20 PROBLEM — M70.61 GREATER TROCHANTERIC BURSITIS OF BOTH HIPS: Status: ACTIVE | Noted: 2023-10-20

## 2024-04-12 ENCOUNTER — APPOINTMENT (OUTPATIENT)
Dept: ORTHOPEDIC SURGERY | Facility: CLINIC | Age: 74
End: 2024-04-12
Payer: MEDICARE

## 2024-04-12 VITALS — WEIGHT: 204 LBS | HEIGHT: 67 IN | BODY MASS INDEX: 32.02 KG/M2

## 2024-04-12 DIAGNOSIS — Z96.652 PRESENCE OF LEFT ARTIFICIAL KNEE JOINT: ICD-10-CM

## 2024-04-12 DIAGNOSIS — Z96.651 PRESENCE OF RIGHT ARTIFICIAL KNEE JOINT: ICD-10-CM

## 2024-04-12 PROCEDURE — 73562 X-RAY EXAM OF KNEE 3: CPT | Mod: RT

## 2024-04-12 PROCEDURE — 99214 OFFICE O/P EST MOD 30 MIN: CPT | Mod: 25

## 2024-05-03 ENCOUNTER — APPOINTMENT (OUTPATIENT)
Dept: ORTHOPEDIC SURGERY | Facility: CLINIC | Age: 74
End: 2024-05-03
Payer: MEDICARE

## 2024-05-03 VITALS — WEIGHT: 204 LBS | HEIGHT: 67 IN | BODY MASS INDEX: 32.02 KG/M2

## 2024-05-03 DIAGNOSIS — M54.50 LOW BACK PAIN, UNSPECIFIED: ICD-10-CM

## 2024-05-03 DIAGNOSIS — R29.818 OTHER SYMPTOMS AND SIGNS INVOLVING THE NERVOUS SYSTEM: ICD-10-CM

## 2024-05-03 DIAGNOSIS — M43.16 SPONDYLOLISTHESIS, LUMBAR REGION: ICD-10-CM

## 2024-05-03 PROCEDURE — 72100 X-RAY EXAM L-S SPINE 2/3 VWS: CPT

## 2024-05-03 PROCEDURE — 99215 OFFICE O/P EST HI 40 MIN: CPT | Mod: 25

## 2024-05-03 RX ORDER — MELOXICAM 15 MG/1
15 TABLET ORAL
Qty: 30 | Refills: 1 | Status: ACTIVE | COMMUNITY
Start: 2024-05-03 | End: 1900-01-01

## 2024-05-03 NOTE — HISTORY OF PRESENT ILLNESS
[de-identified] : 74 year old female presents for evaluation of lower back pain over 1 year, has been worsening. She states she had a fall after slipping on some times and landed on her right lower back/lateral hip. She states that the pain radiates from the right buttock down the leg posteriorly to the calf. Prolonged standing and walking aggravates the pain. Leaning forward with ambulation alleviates the pain. Takes tylenol for the pain. Was told to avoid NSAIDs for her kidneys. Has not tried PT or chiropractic care. Denies MIKE.  Pt denies fever, chills, weight changes, loss of bladder control, bowel incontinence or urinary retention or saddle anesthesia  The patient's past medical history, past surgical history, medications, allergies, and social history were reviewed by me today with the patient and documented accordingly. In addition, the patient's family history, which is noncontributory to this visit, was also reviewed.    [Walking] : worsened by walking [Recumbency] : relieved by recumbency

## 2024-05-03 NOTE — DISCUSSION/SUMMARY
[de-identified] : 73 yo female with progressive neurogenic claudication, unable to walk more then one block, recommend PT, MRI, mobic, ROT 3-4 weeks.   Diagnosis, prognosis, natural history and treatment was discussed with patient. Patient was advised if the following symptoms develop: chills, fever,  loss of bladder control, bowel incontinence or urinary retention, numbness/tingling or weakness is present in upper or lower extremities, to go to the nearest emergency room. This may be a new clinical condition not present at the time of the patient visit that may lead to paralysis and/or death, Patient advised if the above symptoms developed to also call the office immediately to inform us and to go to the nearest emergency room.

## 2024-05-03 NOTE — PHYSICAL EXAM
[Cane] : ambulates with cane [UE/LE] : Sensory: Intact in bilateral upper & lower extremities [0] : left ankle jerk 0 [de-identified] : Lumbar ROM : Restricted TTP L spine and b/l paraspinals L region.  Skin intact L spine. No rashes, ulcers, blisters.  No lymphedema.  Rapid alternating movements- Intact.  Full and non-painful ROM RUE, LUE, RLE, and LLE. Skin intact RUE, LUE, RLE, and LLE. No rashes, blisters, ulcers. NTTP RUE, LUE, RLE, and LLE. No evidence of dislocation or subluxation B/L.    [de-identified] : 2 views AP and Lat of LS Spine from [id number] . Read and dictated by Dr. Isidoro Mukherjee Board Certified orthopaedic surgeon L34 Spondylolisthesis Grade 1

## 2024-05-21 ENCOUNTER — APPOINTMENT (OUTPATIENT)
Dept: MRI IMAGING | Facility: CLINIC | Age: 74
End: 2024-05-21
Payer: MEDICARE

## 2024-05-21 PROCEDURE — 72148 MRI LUMBAR SPINE W/O DYE: CPT

## 2024-06-03 ENCOUNTER — APPOINTMENT (OUTPATIENT)
Dept: ORTHOPEDIC SURGERY | Facility: CLINIC | Age: 74
End: 2024-06-03
Payer: MEDICARE

## 2024-06-03 DIAGNOSIS — M51.36 OTHER INTERVERTEBRAL DISC DEGENERATION, LUMBAR REGION: ICD-10-CM

## 2024-06-03 DIAGNOSIS — M48.07 SPINAL STENOSIS, LUMBOSACRAL REGION: ICD-10-CM

## 2024-06-03 PROCEDURE — 99214 OFFICE O/P EST MOD 30 MIN: CPT | Mod: 95

## 2024-06-03 NOTE — HISTORY OF PRESENT ILLNESS
[Walking] : worsened by walking [Recumbency] : relieved by recumbency [de-identified] : 75 yo female here for MRI.  Symptoms stable.   Prior HPI: 74 year old female presents for evaluation of lower back pain over 1 year, has been worsening. She states she had a fall after slipping on some times and landed on her right lower back/lateral hip. She states that the pain radiates from the right buttock down the leg posteriorly to the calf. Prolonged standing and walking aggravates the pain. Leaning forward with ambulation alleviates the pain. Takes tylenol for the pain. Was told to avoid NSAIDs for her kidneys. Has not tried PT or chiropractic care. Denies MIKE.  Pt denies fever, chills, weight changes, loss of bladder control, bowel incontinence or urinary retention or saddle anesthesia  The patient's past medical history, past surgical history, medications, allergies, and social history were reviewed by me today with the patient and documented accordingly. In addition, the patient's family history, which is noncontributory to this visit, was also reviewed.

## 2024-06-03 NOTE — PHYSICAL EXAM
[Cane] : ambulates with cane [UE/LE] : Sensory: Intact in bilateral upper & lower extremities [0] : left ankle jerk 0 [de-identified] : Lumbar ROM : Restricted TTP L spine and b/l paraspinals L region.  Skin intact L spine. No rashes, ulcers, blisters.  No lymphedema.  Rapid alternating movements- Intact.  Full and non-painful ROM RUE, LUE, RLE, and LLE. Skin intact RUE, LUE, RLE, and LLE. No rashes, blisters, ulcers. NTTP RUE, LUE, RLE, and LLE. No evidence of dislocation or subluxation B/L.    [de-identified] : 2 views AP and Lat of LS Spine from [id number] . Read and dictated by Dr. Nikole Mukherjee Board Certified orthopaedic surgeon L34 Spondylolisthesis Grade 1   EXAM: [id number] - MR SPINE LUMBAR - ORDERED BY: NIKOLE MUKHERJEE  PROCEDURE DATE: 05/21/2024  INTERPRETATION: CLINICAL INFORMATION: Lumbar stenosis  ADDITIONAL CLINICAL INFORMATION: Low back muscle strain S39.012A  TECHNIQUE: Multiplanar, multisequence MRI was performed of the lumbar spine. IV Contrast: None  PRIOR STUDIES: No priors available for comparison.  FINDINGS:  LOCALIZER: No additional findings. BONES: There is no fracture or bone marrow edema. ALIGNMENT: Mild anterolisthesis of L3 on L4. SACROILIAC JOINTS/SACRUM: There is no sacral fracture. The SI joints are partially visualized but are intact. CONUS AND CAUDA EQUINA: The distal cord and conus are normal in signal. Conus terminates at L1. VISUALIZED INTRAPELVIC/INTRA-ABDOMINAL SOFT TISSUES: Normal. PARASPINAL SOFT TISSUES: Fatty atrophy of the paraspinal muscles.   INDIVIDUAL LEVELS:  LOWER THORACIC SPINE: No spinal canal or neuroforaminal stenosis.  L1-L2: Disc osteophyte complex with a superimposed central disc protrusion causing mass effect on the ventral cord. Moderate bilateral facet arthrosis and thickening of ligamentum flavum. These findings are contributing to mild to moderate spinal canal narrowing. No foraminal narrowing. L2-L3: Diffuse disc bulge. There is moderate bilateral facet arthrosis with thickening of the ligamentum flavum causing mass effect on the right hemicord. These findings are contributing to mild bilateral foraminal narrowing and moderate to severe central canal narrowing. L3-L4: Minimal anterolisthesis. There is a diffuse disc bulge with moderate bilateral facet arthrosis and ligamentum flavum thickening. These findings are contributing to mild bilateral foraminal narrowing and severe central canal narrowing. L4-L5: Disc osteophyte complex asymmetric to the left causing narrowing of the bilateral lateral recesses. Moderate bilateral facet arthrosis with thickening of ligamentum flavum. These findings are contributing to mild bilateral foraminal narrowing with mild spinal canal narrowing. L5-S1: Disc osteophyte complex asymmetric to the left. Moderate bilateral facet arthrosis with narrowing of the bilateral lateral recesses and mild bilateral foraminal narrowing. No significant central canal narrowing.   IMPRESSION:  Multilevel degenerative changes of the lumbar spine with up to severe central canal narrowing and mild foraminal narrowing.

## 2024-06-03 NOTE — DISCUSSION/SUMMARY
[de-identified] : 73 yo female with progressive neurogenic claudication, unable to walk more then one block, MRI reveals severe stenosis. Recommend PT and pain management.   Diagnosis, prognosis, natural history and treatment was discussed with patient. Patient was advised if the following symptoms develop: chills, fever,  loss of bladder control, bowel incontinence or urinary retention, numbness/tingling or weakness is present in upper or lower extremities, to go to the nearest emergency room. This may be a new clinical condition not present at the time of the patient visit that may lead to paralysis and/or death, Patient advised if the above symptoms developed to also call the office immediately to inform us and to go to the nearest emergency room.

## 2024-06-03 NOTE — REASON FOR VISIT
[Home] : at home, [unfilled] , at the time of the educational consult. [Medical Office: ([facility])___] : at the medical office located in  [Initial Visit] : an initial visit for

## (undated) DEVICE — VENODYNE/SCD SLEEVE CALF LARGE

## (undated) DEVICE — ELCTR BOVIE PENCIL SMOKE EVACUATION

## (undated) DEVICE — SYR LUER LOK 20CC

## (undated) DEVICE — GLV 6.5 PROTEXIS (WHITE)

## (undated) DEVICE — DRAPE 3/4 SHEET W REINFORCEMENT 56X77"

## (undated) DEVICE — GLV 8.5 PROTEXIS (WHITE)

## (undated) DEVICE — SUT PDO 2 1/2 CIRCLE 40MM NDL 45CM

## (undated) DEVICE — GLV 7.5 PROTEXIS (WHITE)

## (undated) DEVICE — WARMING BLANKET UPPER ADULT

## (undated) DEVICE — SUT POLYSORB 0 30" GS-21 UNDYED

## (undated) DEVICE — WOUND IRR IRRISEPT W 0.5 CHG

## (undated) DEVICE — POSITIONER CARDIAC BUMP

## (undated) DEVICE — SUT POLYSORB 1 36" GS-21 UNDYED

## (undated) DEVICE — GLV 7 PROTEXIS (WHITE)

## (undated) DEVICE — SUT QUILL MONODERM 2-0 30CM 24MM

## (undated) DEVICE — SOL IRR POUR NS 0.9% 500ML

## (undated) DEVICE — MAKO DRAPE KIT

## (undated) DEVICE — ELCTR PLASMA BLADE X 3.0S WIDE TIP

## (undated) DEVICE — DRSG AQUACEL 3.5 X 12"

## (undated) DEVICE — MAKO BLADE STANDARD

## (undated) DEVICE — NDL HYPO SAFE 22G X 1.5" (BLACK)

## (undated) DEVICE — MAKO VIZADISC KNEE TRACKING KIT

## (undated) DEVICE — FEMORAL PREP SINGLE USE SIZE 4

## (undated) DEVICE — DRSG AQUACEL 3.5 X 10"

## (undated) DEVICE — PACK TOTAL KNEE (2 PACKS)

## (undated) DEVICE — GLV 8 PROTEXIS (WHITE)

## (undated) DEVICE — DRSG DERMABOND 0.7ML

## (undated) DEVICE — POSITIONER FOAM EGG CRATE ULNAR 2PCS (PINK)

## (undated) DEVICE — SOL IRR POUR H2O 1000ML

## (undated) DEVICE — SAW BLADE STRYKER SAGITTAL EXTRA WIDE THIN SHORT

## (undated) DEVICE — SPECIMEN CONTAINER 100ML

## (undated) DEVICE — TIBIA PREP SZ4

## (undated) DEVICE — TOURNIQUET CUFF 34" DUAL PORT W PLC

## (undated) DEVICE — SUT POLYSORB 2-0 30" GS-21 UNDYED